# Patient Record
Sex: MALE | Race: WHITE | Employment: FULL TIME | URBAN - METROPOLITAN AREA
[De-identification: names, ages, dates, MRNs, and addresses within clinical notes are randomized per-mention and may not be internally consistent; named-entity substitution may affect disease eponyms.]

---

## 2021-10-12 ENCOUNTER — HOSPITAL ENCOUNTER (EMERGENCY)
Age: 41
Discharge: HOME | End: 2021-10-12
Payer: COMMERCIAL

## 2021-10-12 ENCOUNTER — APPOINTMENT (OUTPATIENT)
Dept: CT IMAGING | Age: 41
End: 2021-10-12
Payer: COMMERCIAL

## 2021-10-12 ENCOUNTER — NURSE TRIAGE (OUTPATIENT)
Dept: OTHER | Facility: CLINIC | Age: 41
End: 2021-10-12

## 2021-10-12 VITALS
WEIGHT: 210 LBS | RESPIRATION RATE: 20 BRPM | OXYGEN SATURATION: 93 % | SYSTOLIC BLOOD PRESSURE: 143 MMHG | HEART RATE: 74 BPM | BODY MASS INDEX: 32.96 KG/M2 | HEIGHT: 67 IN | TEMPERATURE: 97.8 F | DIASTOLIC BLOOD PRESSURE: 90 MMHG

## 2021-10-12 DIAGNOSIS — U07.1 PNEUMONIA DUE TO COVID-19 VIRUS: Primary | ICD-10-CM

## 2021-10-12 DIAGNOSIS — R09.02 HYPOXIA: ICD-10-CM

## 2021-10-12 DIAGNOSIS — J12.82 PNEUMONIA DUE TO COVID-19 VIRUS: Primary | ICD-10-CM

## 2021-10-12 LAB
ALBUMIN SERPL-MCNC: 3.6 G/DL (ref 3.5–4.6)
ALP BLD-CCNC: 62 U/L (ref 35–104)
ALT SERPL-CCNC: 31 U/L (ref 0–41)
ANION GAP SERPL CALCULATED.3IONS-SCNC: 15 MEQ/L (ref 9–15)
AST SERPL-CCNC: 73 U/L (ref 0–40)
BASOPHILS ABSOLUTE: 0 K/UL (ref 0–0.2)
BASOPHILS RELATIVE PERCENT: 0.3 %
BILIRUB SERPL-MCNC: 0.4 MG/DL (ref 0.2–0.7)
BUN BLDV-MCNC: 15 MG/DL (ref 6–20)
C-REACTIVE PROTEIN: 80.8 MG/L (ref 0–5)
CALCIUM SERPL-MCNC: 8.5 MG/DL (ref 8.5–9.9)
CHLORIDE BLD-SCNC: 101 MEQ/L (ref 95–107)
CO2: 24 MEQ/L (ref 20–31)
CREAT SERPL-MCNC: 0.85 MG/DL (ref 0.7–1.2)
EOSINOPHILS ABSOLUTE: 0 K/UL (ref 0–0.7)
EOSINOPHILS RELATIVE PERCENT: 0.3 %
GFR AFRICAN AMERICAN: >60
GFR NON-AFRICAN AMERICAN: >60
GLOBULIN: 3 G/DL (ref 2.3–3.5)
GLUCOSE BLD-MCNC: 132 MG/DL (ref 70–99)
HCT VFR BLD CALC: 46.4 % (ref 42–52)
HEMOGLOBIN: 15.2 G/DL (ref 14–18)
LYMPHOCYTES ABSOLUTE: 0.8 K/UL (ref 1–4.8)
LYMPHOCYTES RELATIVE PERCENT: 11.7 %
MCH RBC QN AUTO: 25.9 PG (ref 27–31.3)
MCHC RBC AUTO-ENTMCNC: 32.7 % (ref 33–37)
MCV RBC AUTO: 79.1 FL (ref 80–100)
MONOCYTES ABSOLUTE: 0.6 K/UL (ref 0.2–0.8)
MONOCYTES RELATIVE PERCENT: 8.5 %
NEUTROPHILS ABSOLUTE: 5.4 K/UL (ref 1.4–6.5)
NEUTROPHILS RELATIVE PERCENT: 79.2 %
PDW BLD-RTO: 14.1 % (ref 11.5–14.5)
PLATELET # BLD: 461 K/UL (ref 130–400)
POC CREATININE WHOLE BLOOD: 0.9
POTASSIUM SERPL-SCNC: 4.1 MEQ/L (ref 3.4–4.9)
RBC # BLD: 5.86 M/UL (ref 4.7–6.1)
SODIUM BLD-SCNC: 140 MEQ/L (ref 135–144)
TOTAL PROTEIN: 6.6 G/DL (ref 6.3–8)
WBC # BLD: 6.8 K/UL (ref 4.8–10.8)

## 2021-10-12 PROCEDURE — 96374 THER/PROPH/DIAG INJ IV PUSH: CPT

## 2021-10-12 PROCEDURE — 86140 C-REACTIVE PROTEIN: CPT

## 2021-10-12 PROCEDURE — 6360000002 HC RX W HCPCS: Performed by: PHYSICIAN ASSISTANT

## 2021-10-12 PROCEDURE — 93005 ELECTROCARDIOGRAM TRACING: CPT

## 2021-10-12 PROCEDURE — 71275 CT ANGIOGRAPHY CHEST: CPT

## 2021-10-12 PROCEDURE — 6360000004 HC RX CONTRAST MEDICATION: Performed by: PHYSICIAN ASSISTANT

## 2021-10-12 PROCEDURE — 36415 COLL VENOUS BLD VENIPUNCTURE: CPT

## 2021-10-12 PROCEDURE — 80053 COMPREHEN METABOLIC PANEL: CPT

## 2021-10-12 PROCEDURE — 85025 COMPLETE CBC W/AUTO DIFF WBC: CPT

## 2021-10-12 PROCEDURE — 99283 EMERGENCY DEPT VISIT LOW MDM: CPT

## 2021-10-12 RX ORDER — DEXAMETHASONE 6 MG/1
6 TABLET ORAL
Qty: 10 TABLET | Refills: 0 | Status: SHIPPED | OUTPATIENT
Start: 2021-10-12 | End: 2021-10-22

## 2021-10-12 RX ORDER — ALBUTEROL SULFATE 90 UG/1
2 AEROSOL, METERED RESPIRATORY (INHALATION) 4 TIMES DAILY PRN
Qty: 18 G | Refills: 1 | Status: SHIPPED | OUTPATIENT
Start: 2021-10-12 | End: 2022-02-10 | Stop reason: SDUPTHER

## 2021-10-12 RX ORDER — DEXAMETHASONE SODIUM PHOSPHATE 10 MG/ML
6 INJECTION INTRAMUSCULAR; INTRAVENOUS ONCE
Status: COMPLETED | OUTPATIENT
Start: 2021-10-12 | End: 2021-10-12

## 2021-10-12 RX ADMIN — IOPAMIDOL 100 ML: 755 INJECTION, SOLUTION INTRAVENOUS at 17:28

## 2021-10-12 RX ADMIN — DEXAMETHASONE SODIUM PHOSPHATE 6 MG: 10 INJECTION INTRAMUSCULAR; INTRAVENOUS at 18:30

## 2021-10-12 ASSESSMENT — ENCOUNTER SYMPTOMS
ABDOMINAL DISTENTION: 0
COUGH: 1
VOMITING: 0
APNEA: 0
NAUSEA: 0
EYE DISCHARGE: 0
ANAL BLEEDING: 0
VOICE CHANGE: 0
SHORTNESS OF BREATH: 1
PHOTOPHOBIA: 0

## 2021-10-12 NOTE — ED NOTES
Bed: 29  Expected date: 10/12/21  Expected time:   Means of arrival:   Comments:  42year old male with difficulty breathing after being diagnosed with covid 12 days ago.  110/60, was 92% on ra but is now  97% on 3 liters 20 in the right ac

## 2021-10-12 NOTE — TELEPHONE ENCOUNTER
Reason for Disposition   MILD difficulty breathing (e.g., minimal/no SOB at rest, SOB with walking, pulse <100)    Answer Assessment - Initial Assessment Questions  1. COVID-19 DIAGNOSIS: \"Who made your Coronavirus (COVID-19) diagnosis? \" \"Was it confirmed by a positive lab test?\" If not diagnosed by a HCP, ask \"Are there lots of cases (community spread) where you live? \" (See public health department website, if unsure)      covid + October 1, 2021 . Tested at [facility]. 2. COVID-19 EXPOSURE: \"Was there any known exposure to COVID before the symptoms began? \" [facility] Definition of close contact: within 6 feet (2 meters) for a total of 15 minutes or more over a 24-hour period. Not that he was aware of    3. ONSET: \"When did the COVID-19 symptoms start? \"       Oct 1, 2021    4. WORST SYMPTOM: \"What is your worst symptom? \" (e.g., cough, fever, shortness of breath, muscle aches)      Gets \"winded\" if he goes to one room to the next - over the last couple days, he feels like this is worsening. 5. COUGH: \"Do you have a cough? \" If Yes, ask: \"How bad is the cough? \"        \"here and there\" yes. When he sits up and takes a deep breath, then he coughs    6. FEVER: \"Do you have a fever? \" If Yes, ask: \"What is your temperature, how was it measured, and when did it start? \"      Never had a fever    7. RESPIRATORY STATUS: \"Describe your breathing? \" (e.g., shortness of breath, wheezing, unable to speak)       See above    8. BETTER-SAME-WORSE: Ruben Antis you getting better, staying the same or getting worse compared to yesterday? \"  If getting worse, ask, \"In what way? \"      Compared to yesterday, \"slighter better\" but he has not got OOB today yet    9. HIGH RISK DISEASE: \"Do you have any chronic medical problems? \" (e.g., asthma, heart or lung disease, weak immune system, obesity, etc.)      Denies    10. PREGNANCY: \"Is there any chance you are pregnant? \" \"When was your last menstrual period? \"        No    11.  OTHER SYMPTOMS: \"Do you have any other symptoms? \"  (e.g., chills, fatigue, headache, loss of smell or taste, muscle pain, sore throat; new loss of smell or taste especially support the diagnosis of COVID-19)        Weakness, fatigue, appetite is different than usual , he states he is drinking/keeping hydrated . Sore throat present, voice is very hoarse. Protocols used: CORONAVIRUS (OAKYP-35) DIAGNOSED OR SUSPECTED-ADULT-OH    Patient called E[facility] with red flag complaint to establish care with unknown . Brief description of triage: see above    Triage indicates for patient to ER/UC/or office with pcp approval .  Per workflow, (pt is unestablished) this RN recommended either ER or UC. Care advice provided, patient verbalizes understanding; denies any other questions or concerns; instructed to call back for any new or worsening symptoms. Writer provided warm transfer to [facility] at [facility] for appointment scheduling to establish care. Attention Provider: Thank you for allowing me to participate in the care of your patient. The patient was connected to triage in response to information provided to the ECC. Please do not respond through this encounter as the response is not directed to a shared pool.

## 2021-10-12 NOTE — ED TRIAGE NOTES
Pt arrives to ED via [facility] in regards to SOB. Pt states he was tested for Covid on the 1st when he began to have symptoms. Pts test came back positive on the 5th. Pt reports SOB began to worsen approximately 2 days ago. Pt went to Urgent Care today and was brought here due to being diaphoretic. A&OX4. Skin pale, cool, and clammy. Resps mildly labored on room air.

## 2021-10-12 NOTE — ED PROVIDER NOTES
[facility] ED  eMERGENCY dEPARTMENT eNCOUnter      Pt Name: Idania Mejias  MRN: [id number]  [place] 1980  Date of evaluation: 10/12/2021  Provider: Debbie Machado Dr       Chief Complaint   Patient presents with    Shortness of Breath         HISTORY OF PRESENT ILLNESS   (Location/Symptom, Timing/Onset,Context/Setting, Quality, Duration, Modifying Factors, Severity)  Note limiting factors. Idania Mejias is a 42 y.o. male who presents to the emergency department presents with shortness of breath was tested for Covid on the first extremity began to have symptoms became back positive fifth shortness of breath worsened approximately 2 days ago he does have cough he was sent from urgent care patient was cool and clammy on room air. Patient noted to be 91 to 92% on room air at rest.  Symptoms moderate severity worse with motion or ambulation or exertion nothing improves symptoms    HPI    NursingNotes were reviewed. REVIEW OF SYSTEMS    (2-9 systems for level 4, 10 or more for level 5)     Review of Systems   Constitutional: Positive for diaphoresis. Negative for activity change, appetite change, fever and unexpected weight change. HENT: Negative for ear discharge, nosebleeds and voice change. Eyes: Negative for photophobia and discharge. Respiratory: Positive for cough and shortness of breath. Negative for apnea. Cardiovascular: Negative for chest pain. Gastrointestinal: Negative for abdominal distention, anal bleeding, nausea and vomiting. Endocrine: Negative for cold intolerance, heat intolerance and polyphagia. Genitourinary: Negative for dysuria and genital sores. Musculoskeletal: Negative for joint swelling and neck pain. Skin: Negative for pallor. Allergic/Immunologic: Negative for immunocompromised state. Neurological: Negative for seizures and facial asymmetry. Hematological: Does not bruise/bleed easily.    Psychiatric/Behavioral: Negative for behavioral problems, self-injury and sleep disturbance. All other systems reviewed and are negative. Except as noted above the remainder of the review of systems was reviewed and negative. PAST MEDICAL HISTORY     Past Medical History:   Diagnosis Date    Bell palsy          SURGICALHISTORY     History reviewed. No pertinent surgical history. CURRENT MEDICATIONS       Previous Medications    No medications on file            Patient has no known allergies. FAMILY HISTORY     History reviewed. No pertinent family history. SOCIAL HISTORY       Social History     Socioeconomic History    Marital status: Single     Spouse name: None    Number of children: None    Years of education: None    Highest education level: None   Occupational History    None   Tobacco Use    Smoking status: Former Smoker     Types: Cigarettes     Quit date: 2007     Years since quittin.7   Substance and Sexual Activity    Alcohol use: Yes     Alcohol/week: 5.0 standard drinks     Types: 6 drink(s) per week    Drug use: None    Sexual activity: None   Other Topics Concern    None   Social History Narrative    None     Social Determinants of Health     Financial Resource Strain:     Difficulty of Paying Living Expenses:    Food Insecurity:     Worried About Running Out of Food in the Last Year:     Ran Out of Food in the Last Year:    Transportation Needs:     Lack of Transportation (Medical):      Lack of Transportation (Non-Medical):    Physical Activity:     Days of Exercise per Week:     Minutes of Exercise per Session:    Stress:     Feeling of Stress :    Social Connections:     Frequency of Communication with Friends and Family:     Frequency of Social Gatherings with Friends and Family:     Attends [employer] Services:     Active Member of Clubs or Organizations:     Attends Club or Organization Meetings:     Marital Status:    Intimate Partner Violence:     Fear of Current or Ex-Partner:     Emotionally Abused:     Physically Abused:     Sexually Abused:        SCREENINGS   Ebola Virus Disease (EVD) Screening   Temp: 97.8 °F (36.6 °C)  CIWA Assessment  BP: (!) 143/90  Pulse: 74    PHYSICAL EXAM    (up to 7 for level 4, 8 or more for level 5)     ED Triage Vitals   BP Temp Temp Source Pulse Resp SpO2 Height Weight   10/12/21 1556 10/12/21 1556 10/12/21 1556 10/12/21 1556 10/12/21 1556 10/12/21 1602 10/12/21 1556 10/12/21 1556   (!) 143/90 97.8 °F (36.6 °C) Oral 74 20 93 % 5' 7\" (1.702 m) 210 lb (95.3 kg)       Physical Exam  Vitals and nursing note reviewed. Constitutional:       General: He is not in acute distress. Appearance: He is well-developed. HENT:      Head: Normocephalic and atraumatic. Right Ear: External ear normal.      Left Ear: External ear normal.      Nose: Nose normal.      Mouth/Throat:      Mouth: Mucous membranes are moist.   Eyes:      General:         Right eye: No discharge. Left eye: No discharge. Pupils: Pupils are equal, round, and reactive to light. Cardiovascular:      Rate and Rhythm: Normal rate and regular rhythm. Pulses: Normal pulses. Heart sounds: Normal heart sounds. Pulmonary:      Effort: Pulmonary effort is normal. No respiratory distress. Breath sounds: No stridor. Decreased breath sounds present. No wheezing, rhonchi or rales. Chest:      Chest wall: No tenderness. Abdominal:      General: Bowel sounds are normal. There is no distension. Palpations: Abdomen is soft. Tenderness: There is no abdominal tenderness. Musculoskeletal:         General: Normal range of motion. Cervical back: Normal range of motion and neck supple. Skin:     General: Skin is warm. Findings: No erythema. Neurological:      Mental Status: He is alert and oriented to person, place, and time.    Psychiatric:         Mood and Affect: Mood normal.         RESULTS     EKG: All EKG's are interpreted by the Emergency Department Physician who either signs or Co-signsthis chart in the absence of a cardiologist.    EKG normal sinus rhythm rate 76 - ST segment elevation.  ms QRS 88 ms QT 4 8 ms PRT axes positive    RADIOLOGY:   Non-plain filmimages such as CT, Ultrasound and MRI are read by the radiologist. Plain radiographic images are visualized and preliminarily interpreted by the emergency physician with the below findings:         Interpretation per the Radiologist below, if available at the time ofthis note:    CTA Chest W WO  (PE study)   Final Result   Diffuse bilateral peripheral groundglass opacity. Finding may be seen in patients with covid 19 pneumonia. However, other infectious and inflammatory etiologies may result in a similar radiographic appearance      Bilateral lower lobe subsegmental atelectasis/pneumonia. No CT evidence pulmonary embolism. All CT scans at this facility use dose modulation, iterative reconstruction, and/or weight based dosing when appropriate to reduce radiation dose to as low as reasonably achievable. ED BEDSIDE ULTRASOUND:   Performed by ED Physician - none    LABS:  Labs Reviewed   COMPREHENSIVE METABOLIC PANEL - Abnormal; Notable for the following components:       Result Value    Glucose 132 (*)     AST 73 (*)     All other components within normal limits   CBC WITH AUTO DIFFERENTIAL - Abnormal; Notable for the following components:    MCV 79.1 (*)     MCH 25.9 (*)     MCHC 32.7 (*)     Platelets 079 (*)     Lymphocytes Absolute 0.8 (*)     All other components within normal limits   C-REACTIVE PROTEIN - Abnormal; Notable for the following components:    CRP 80.8 (*)     All other components within normal limits   POCT CREATININE - URINE - Normal       All other labs were within normal range or not returned as of this dictation.     EMERGENCY DEPARTMENT COURSE and DIFFERENTIAL DIAGNOSIS/MDM:   Vitals:    Vitals:    10/12/21 1556 10/12/21 1602   BP: (!) 143/90    Pulse: 74    Resp: 20    Temp: 97.8 °F (36.6 °C)    TempSrc: Oral    SpO2:  93%   Weight: 210 lb (95.3 kg)    Height: 5' 7\" (1.702 m)             MDM  Number of Diagnoses or Management Options  Hypoxia  Pneumonia due to COVID-19 virus  Diagnosis management comments: Patient states positive Covid test performed at [facility] on the first was by several days later. Patient noted to be 90% on room air lying down we will add oxygen albuterol and Decadron for home will reassigned to primary care patient to return to if any symptoms worsen or new symptoms develop including pulse oximetry 89% or less on oxygen. Amount and/or Complexity of Data Reviewed  Clinical lab tests: reviewed and ordered  Tests in the radiology section of CPT®: reviewed and ordered  Discuss the patient with other providers: yes        CRITICAL CARE TIME       CONSULTS:  None    PROCEDURES:  Unless otherwise noted below, none     Procedures    FINAL IMPRESSION      1. Pneumonia due to COVID-19 virus    2.  Hypoxia          DISPOSITION/PLAN   DISPOSITION        PATIENT REFERRED TO:  MARICARMEN Sanchez CNP  [address] [place] [address]  [phone]    Call in 1 day      [facility]) ED  [address] [facility] [address]  [phone]    If symptoms worsen      DISCHARGE MEDICATIONS:  New Prescriptions    ALBUTEROL SULFATE HFA (VENTOLIN HFA) 108 (90 BASE) MCG/ACT INHALER    Inhale 2 puffs into the lungs 4 times daily as needed for Wheezing or Shortness of Breath With spacer and instruct on use    DEXAMETHASONE (DECADRON) 6 MG TABLET    Take 1 tablet by mouth daily (with breakfast) for 10 days          (Please note that portions of this note were completed with a voice recognition program.  Efforts were made to edit the dictations but occasionally words are mis-transcribed.)    Seema Echeverria PA-C (electronically signed)  Attending Emergency Physician       Seema Echeverria PEREZ  10/12/21 0825

## 2021-10-12 NOTE — ED NOTES
This nurse explained and demonstrated how to you oxygen machine and how to use and read pulse oximetry device pt was able to verbalize instructions       Izzy Davidson RN  10/12/21 1958

## 2021-10-13 ENCOUNTER — TELEPHONE (OUTPATIENT)
Dept: FAMILY MEDICINE CLINIC | Age: 41
End: 2021-10-13

## 2021-10-13 ENCOUNTER — VIRTUAL VISIT (OUTPATIENT)
Dept: FAMILY MEDICINE CLINIC | Age: 41
End: 2021-10-13
Payer: COMMERCIAL

## 2021-10-13 DIAGNOSIS — J12.82 PNEUMONIA DUE TO COVID-19 VIRUS: Primary | ICD-10-CM

## 2021-10-13 DIAGNOSIS — U07.1 PNEUMONIA DUE TO COVID-19 VIRUS: Primary | ICD-10-CM

## 2021-10-13 PROCEDURE — 99423 OL DIG E/M SVC 21+ MIN: CPT | Performed by: STUDENT IN AN ORGANIZED HEALTH CARE EDUCATION/TRAINING PROGRAM

## 2021-10-13 SDOH — ECONOMIC STABILITY: TRANSPORTATION INSECURITY
IN THE PAST 12 MONTHS, HAS LACK OF TRANSPORTATION KEPT YOU FROM MEETINGS, WORK, OR FROM GETTING THINGS NEEDED FOR DAILY LIVING?: NO

## 2021-10-13 SDOH — ECONOMIC STABILITY: FOOD INSECURITY: WITHIN THE PAST 12 MONTHS, YOU WORRIED THAT YOUR FOOD WOULD RUN OUT BEFORE YOU GOT MONEY TO BUY MORE.: NEVER TRUE

## 2021-10-13 SDOH — ECONOMIC STABILITY: FOOD INSECURITY: WITHIN THE PAST 12 MONTHS, THE FOOD YOU BOUGHT JUST DIDN'T LAST AND YOU DIDN'T HAVE MONEY TO GET MORE.: NEVER TRUE

## 2021-10-13 SDOH — ECONOMIC STABILITY: TRANSPORTATION INSECURITY
IN THE PAST 12 MONTHS, HAS THE LACK OF TRANSPORTATION KEPT YOU FROM MEDICAL APPOINTMENTS OR FROM GETTING MEDICATIONS?: NO

## 2021-10-13 ASSESSMENT — ENCOUNTER SYMPTOMS
DIARRHEA: 0
COUGH: 1
NAUSEA: 0
SORE THROAT: 0
WHEEZING: 0
EYE DISCHARGE: 0
RHINORRHEA: 1
ABDOMINAL PAIN: 0
VOMITING: 0
SINUS PAIN: 0
SHORTNESS OF BREATH: 1
SINUS PRESSURE: 0

## 2021-10-13 ASSESSMENT — PATIENT HEALTH QUESTIONNAIRE - PHQ9
1. LITTLE INTEREST OR PLEASURE IN DOING THINGS: 0
SUM OF ALL RESPONSES TO PHQ9 QUESTIONS 1 & 2: 0
SUM OF ALL RESPONSES TO PHQ QUESTIONS 1-9: 0
2. FEELING DOWN, DEPRESSED OR HOPELESS: 0
SUM OF ALL RESPONSES TO PHQ QUESTIONS 1-9: 0
SUM OF ALL RESPONSES TO PHQ QUESTIONS 1-9: 0

## 2021-10-13 ASSESSMENT — SOCIAL DETERMINANTS OF HEALTH (SDOH): HOW HARD IS IT FOR YOU TO PAY FOR THE VERY BASICS LIKE FOOD, HOUSING, MEDICAL CARE, AND HEATING?: NOT HARD AT ALL

## 2021-10-13 NOTE — TELEPHONE ENCOUNTER
Pt mom calling. mom ph [phone]  [place]. Pt recently in ED. Post covid - pneumonia. Pt has appt with [facility] to establish care on 10/14/2021    Yesterday patient O2 was around 90 - 93%  Today ot O2 is at 92%    Pt mom concerned. And would like to know what is advised.

## 2021-10-13 NOTE — PROGRESS NOTES
Thaddeus Arnold (:  1980) is a 42 y.o. male,New patient, here for evaluation of the following chief complaint(s): Follow-up (ER visit 10/12/21 tested positive for COVID & pneumonia. Sister reports patient is having a hard time talking due to being SOB. Is using inhaler, but is coughing when using it so not sure how effective it is. PO is staying around 92 in a reclined position. Is not feeling much better today. )         ASSESSMENT/PLAN:  1. Pneumonia due to COVID-19 virus  Patient with diagnosis of COVID-19 pneumonia. He was started on steroid, albuterol and oxygen therapy. He does seem to be somewhat improved today per his report. He is outside the window for antibody therapy at this time. Return precautions explained. I did instruct both him and his sister that if he develops severe shortness of breath, chest pain, low pulse ox despite treatment with oxygen or any other concerning symptoms they should seek care in the emergency room right away. Both voiced understanding. Supportive care recommended. Plan for follow-up in 2 to 3 weeks with primary care provider. All questions answered. Follow-up as scheduled. Return in about 2 weeks (around 10/27/2021) for follow up. SUBJECTIVE/OBJECTIVE:  HPI     Patient with follow-up for ER visit after being diagnosed with COVID-19 pneumonia. He states that he tested positive at [facility] on 10/1. His result was received on 10/5. He was seen in the ER yesterday. He was started on steroids, albuterol and oxygen therapy. He does report that he feels somewhat better today. He states that his Covid symptoms have improved. He does have a lingering cough and shortness of breath. He is especially short of breath with exertion. No fevers. No nausea or vomiting. He states that his inhaler does help his shortness of breath. He states that the previous 2 days were worse. No chest pain. No wheezing. It is just a dry cough. He does report that he is improving overall.  He is currently on 3 L of oxygen. His pulse ox has been around 95% while sitting up. In a reclined position it is 92 to 93%. It is 90% when laying down. He is overall healthy. No past medical history. He is not on any daily medications. He has no other concerns at this time. Review of Systems   Constitutional: Positive for chills and fatigue. Negative for fever and unexpected weight change. HENT: Positive for congestion and rhinorrhea. Negative for sinus pressure, sinus pain and sore throat. Eyes: Negative for discharge. Respiratory: Positive for cough and shortness of breath. Negative for wheezing. Cardiovascular: Negative for chest pain, palpitations and leg swelling. Gastrointestinal: Negative for abdominal pain, diarrhea, nausea and vomiting. Musculoskeletal: Negative for arthralgias and joint swelling. Skin: Negative for rash. Neurological: Negative for weakness, light-headedness, numbness and headaches. Psychiatric/Behavioral: Negative for confusion. The patient is not nervous/anxious. No flowsheet data found. Physical Exam  Due to nature of virtual visit, unable to complete full physical exam at this time, patient does not sound short of breath while talking. He does not appear to be in respiratory distress. Nasal cannula oxygen is in place. On this date 10/13/2021 I have spent 25 minutes reviewing previous notes, test results and face to face (virtual) with the patient discussing the diagnosis and importance of compliance with the treatment plan as well as documenting on the day of the visit. Abilio Gonzalez, was evaluated through a synchronous (real-time) audio-video encounter. The patient (or guardian if applicable) is aware that this is a billable service. Verbal consent to proceed has been obtained within the past 12 months.  The visit was conducted pursuant to the emergency declaration under the [address] waiver authority and the [employer] Act. Patient identification was verified, and a caregiver was present when appropriate. The patient was located in a state where the provider was credentialed to provide care. An electronic signature was used to authenticate this note.     --Erasmo Aponte, DO

## 2021-10-13 NOTE — PATIENT INSTRUCTIONS
Patient Education        Learning About COVID-19 and Flu Symptoms  How can you tell COVID-19 from the flu? COVID-19 and the flu have similar symptoms. The two can be hard to tell apart. The only way to know for sure which illness you have is to be tested. Since the symptoms are so alike, it makes sense to act as if you have COVID-19 until your test results come back. This means staying home and limiting contact with people in your home. You'll need to wash your hands often and disinfect surfaces that you touch. And be sure to wear a mask when you're around other people. This is also good advice if you think you have the flu. COVID-19 and the flu have these symptoms in common:  · Fever or chills  · Cough  · Shortness of breath  · Fatigue (tiredness)  · Sore throat  · Runny or stuffy nose  · Muscle and body aches  · Headache  · Vomiting and diarrhea (more common in children than adults)  COVID-19 has another symptom that also may occur:  · New loss of taste or smell  COVID-19 symptoms may appear from 2 to 14 days after infection. Flu symptoms usually appear 1 to 4 days after infection. Why should you get a flu shot during the COVID-19 pandemic? It's important to get your yearly flu vaccine. Both the flu and COVID-19 can be active at the same time. You can get sick with both infections at once. And having both may make you more sick than getting just one. The flu vaccine won't protect you from COVID-19. But it can help prevent the flu or reduce its symptoms. If fewer people get very ill with the flu, this will help free up medical resources that are needed for COVID-19 patients. Where can you learn more? Go to https://[employer]userfox.[employer]dresses. org and sign in to your [employer] account. Enter C123 in the [employer] box to learn more about \"Learning About COVID-19 and Flu Symptoms. \"     If you do not have an account, please click on the \"Sign Up Now\" link.   Current as of: March 26, 2021               Content Version: 13.0  © 7089-1446 Healthwise, Incorporated. Care instructions adapted under license by [facility] ([facility]). If you have questions about a medical condition or this instruction, always ask your healthcare professional. [address] any warranty or liability for your use of this information.

## 2021-10-13 NOTE — ED NOTES
While walking pt out to lobby for discharge. Pt needed to stop due to sob.  This nurse put on oxygen that he was sent home with pt states he feels better      Yan Eisenberg RN  10/12/21 2011

## 2021-10-14 ENCOUNTER — VIRTUAL VISIT (OUTPATIENT)
Dept: FAMILY MEDICINE CLINIC | Age: 41
End: 2021-10-14
Payer: COMMERCIAL

## 2021-10-14 DIAGNOSIS — U07.1 PNEUMONIA DUE TO COVID-19 VIRUS: Primary | ICD-10-CM

## 2021-10-14 DIAGNOSIS — J12.82 PNEUMONIA DUE TO COVID-19 VIRUS: Primary | ICD-10-CM

## 2021-10-14 PROCEDURE — 99442 PR PHYS/QHP TELEPHONE EVALUATION 11-20 MIN: CPT | Performed by: NURSE PRACTITIONER

## 2021-10-14 ASSESSMENT — ENCOUNTER SYMPTOMS
SHORTNESS OF BREATH: 1
COUGH: 1

## 2021-10-14 NOTE — PROGRESS NOTES
10/14/2021    TELEHEALTH EVALUATION -- Audio/Visual (During JNEKO-13 public health emergency)    Due to COVID 19 outbreak, patient's office visit was converted to a virtual visit. Patient was contacted and agreed to proceed with a virtual visit via telephone call. The risks and benefits of converting to a virtual visit were discussed in light of the current infectious disease epidemic. Patient also understood that insurance coverage and co-pays are up to their individual insurance plans. This visit started at 18    HPI:    Solo Landakimmie (:  1980) has requested an audio/video evaluation for the following concern(s):    Dx with covid pneumonia. Things have been going \"ok\"  Does have significant cough. Wondering what he can take. Has coricidan at home. O2 this am is 96%. Oxygen does drop in the low 90s. Seems better when sitting up. Is on oxygen all the time- around 3 Liters    Review of Systems   Constitutional: Positive for fatigue. Respiratory: Positive for cough and shortness of breath. Cardiovascular: Negative for chest pain. Neurological: Positive for weakness. Prior to Visit Medications    Medication Sig Taking? Authorizing Provider   albuterol sulfate HFA (VENTOLIN HFA) 108 (90 Base) MCG/ACT inhaler Inhale 2 puffs into the lungs 4 times daily as needed for Wheezing or Shortness of Breath With spacer and instruct on use Yes Shi Cintron PA-C   dexamethasone (DECADRON) 6 MG tablet Take 1 tablet by mouth daily (with breakfast) for 10 days Yes Shi Cintron PA-C       Social History     Tobacco Use    Smoking status: Former Smoker     Packs/day: 0.25     Years: 2.00     Pack years: 0.50     Types: Cigarettes     Quit date: 2007     Years since quittin.7    Smokeless tobacco: Never Used   Substance Use Topics    Alcohol use:  Yes     Alcohol/week: 5.0 standard drinks     Types: 6 drink(s) per week    Drug use: Not on file        No Known Allergies,   Past Medical History:   Diagnosis Date    Bell palsy    , No past surgical history on file.,   Social History     Tobacco Use    Smoking status: Former Smoker     Packs/day: 0.25     Years: 2.00     Pack years: 0.50     Types: Cigarettes     Quit date: 2007     Years since quittin.7    Smokeless tobacco: Never Used   Substance Use Topics    Alcohol use: Yes     Alcohol/week: 5.0 standard drinks     Types: 6 drink(s) per week    Drug use: Not on file   , No family history on file. PHYSICAL EXAMINATION:  [ INSTRUCTIONS:  \"[x]\" Indicates a positive item  \"[]\" Indicates a negative item  -- DELETE ALL ITEMS NOT EXAMINED]  [x] Alert  [x] Oriented to person/place/time    [x] No apparent distress  [] Toxic appearing    [] Face flushed appearing [] Sclera clear  [] Lips are cyanotic      [] Breathing appears normal  [] Appears tachypneic      [] Rash on visible skin    [] Cranial Nerves II-XII grossly intact    [] Motor grossly intact in visible upper extremities    [] Motor grossly intact in visible lower extremities    [x] Normal Mood  [] Anxious appearing    [] Depressed appearing  [] Confused appearing      [] Poor short term memory  [] Poor long term memory    [] OTHER:      Due to this being a TeleHealth encounter, evaluation of the following organ systems is limited: Vitals/Constitutional/EENT/Resp/CV/GI//MS/Neuro/Skin/Heme-Lymph-Imm. ASSESSMENT/PLAN:  1. Pneumonia due to COVID-19 virus  - reassured. Informed mom of warning signs for worsening conditions. Side effects, adverse effects of the medication prescribed today, as well as treatment plan/ rationale and result expectations have been discussed with the patient who expresses understanding and desires to proceed. Close follow up to evaluate treatment results and for coordination of care. I have reviewed the patient's medical history in detail and updated the computerized patient record.     As always, patient is advised

## 2021-10-15 ENCOUNTER — TELEPHONE (OUTPATIENT)
Dept: FAMILY MEDICINE CLINIC | Age: 41
End: 2021-10-15

## 2021-10-15 LAB
EKG ATRIAL RATE: 76 BPM
EKG P AXIS: 11 DEGREES
EKG P-R INTERVAL: 130 MS
EKG Q-T INTERVAL: 408 MS
EKG QRS DURATION: 88 MS
EKG QTC CALCULATION (BAZETT): 459 MS
EKG R AXIS: 20 DEGREES
EKG T AXIS: 69 DEGREES
EKG VENTRICULAR RATE: 76 BPM

## 2021-10-15 NOTE — TELEPHONE ENCOUNTER
I would keep it on all the time and make appointment in two weeks and we can check his o2 requirements in the office

## 2021-10-15 NOTE — TELEPHONE ENCOUNTER
Pt wants to know about o2 and if he needs to be on it all the time or can ween down  Pt with pne    Sitting up his o2 is  95-97    Walking  Unsure    Moving around a little more   Appetite is better and cough is productive vs dry    Currently using 3 litters continually    Sister is asking if o2 can be used as needed or does he keep it on all the time? ?    (Dr. Joey Leahy out of office)

## 2021-10-18 ENCOUNTER — TELEPHONE (OUTPATIENT)
Dept: FAMILY MEDICINE CLINIC | Age: 41
End: 2021-10-18

## 2021-10-18 NOTE — TELEPHONE ENCOUNTER
Patient's mother calling. Would it be possible to get a smaller oxygen machine? The one patient has now  Is very big and they can not have it in his Bedroom.     [phone]

## 2021-10-18 NOTE — TELEPHONE ENCOUNTER
I would have patient contact the DME provider to see if they have a smaller unit available.   Thanks

## 2021-10-18 NOTE — TELEPHONE ENCOUNTER
SHAHANA informed they should contact the DME company & request something smaller. Told them if the DME company needs something from the Dr. Sheryl Melvin could fax us what is needed. Informed to call back with any other questions.

## 2021-10-27 ENCOUNTER — OFFICE VISIT (OUTPATIENT)
Dept: FAMILY MEDICINE CLINIC | Age: 41
End: 2021-10-27
Payer: COMMERCIAL

## 2021-10-27 VITALS
WEIGHT: 196 LBS | BODY MASS INDEX: 29.7 KG/M2 | OXYGEN SATURATION: 100 % | HEIGHT: 68 IN | SYSTOLIC BLOOD PRESSURE: 100 MMHG | DIASTOLIC BLOOD PRESSURE: 70 MMHG | HEART RATE: 89 BPM

## 2021-10-27 DIAGNOSIS — Z86.16 HISTORY OF COVID-19: Primary | ICD-10-CM

## 2021-10-27 DIAGNOSIS — Z13.6 ENCOUNTER FOR LIPID SCREENING FOR CARDIOVASCULAR DISEASE: ICD-10-CM

## 2021-10-27 DIAGNOSIS — Z13.220 ENCOUNTER FOR LIPID SCREENING FOR CARDIOVASCULAR DISEASE: ICD-10-CM

## 2021-10-27 DIAGNOSIS — Z13.1 SCREENING FOR DIABETES MELLITUS (DM): ICD-10-CM

## 2021-10-27 DIAGNOSIS — G93.31 POSTVIRAL FATIGUE SYNDROME: ICD-10-CM

## 2021-10-27 PROCEDURE — 99214 OFFICE O/P EST MOD 30 MIN: CPT | Performed by: STUDENT IN AN ORGANIZED HEALTH CARE EDUCATION/TRAINING PROGRAM

## 2021-10-27 RX ORDER — ACETAMINOPHEN 160 MG
TABLET,DISINTEGRATING ORAL
COMMUNITY

## 2021-10-27 RX ORDER — MULTIVIT WITH MINERALS/LUTEIN
250 TABLET ORAL DAILY
COMMUNITY

## 2021-10-27 NOTE — PROGRESS NOTES
Subjective  Brown Oneill, 42 y.o. male presents today with:  Chief Complaint   Patient presents with    2 Week Follow-Up     States he is feeling better than last week. States he is still very fatigued. Can only do something for a short amount of time before he has to sit & rest. States he still has occasional \"tightness\" in his chest. Is wear oxygen daily. States he is able to shower without it but has to put it back on within 10-15 min. HPI     Patient with appointment for 2-week follow-up after recent COVID-19 infection. He states he is feeling a lot better than when he was first diagnosed with Covid. He is still wearing nasal cannula oxygen. He states that he has not really been checking his pulse ox anymore as he has been feeling well. He states that he does have some shortness of breath with exertion. He states he is not really sure what his oxygen levels do is he is not really wearing the pulse oximeter. He states that if he takes it off for a little while he does become more short of breath. He states that he thinks he may be anxious when he takes it off. He denies any chest pain, fevers or chills. No lingering cough. He is interested in seeing pulmonology. He does report good sleep. He does need paperwork completed to return to work. He works as a  at [employer]. He states he is not sure how he will do at work but is wanting to get back to work. He has no other concerns at this time. Review of Systems   Constitutional: Negative for chills, fatigue, fever and unexpected weight change. HENT: Negative for congestion, rhinorrhea and sore throat. Eyes: Negative for discharge. Respiratory: Positive for shortness of breath. Negative for cough and wheezing. Cardiovascular: Negative for chest pain, palpitations and leg swelling. Gastrointestinal: Negative for abdominal pain, diarrhea, nausea and vomiting. Genitourinary: Negative for difficulty urinating. Musculoskeletal: Negative for arthralgias and joint swelling. Skin: Negative for rash. Neurological: Negative for weakness, light-headedness, numbness and headaches. Psychiatric/Behavioral: Negative for confusion. The patient is not nervous/anxious. Past Medical History:   Diagnosis Date    Bell palsy      History reviewed. No pertinent surgical history. Current Outpatient Medications   Medication Sig Dispense Refill    Cholecalciferol (VITAMIN D3) 50 MCG (2000 UT) CAPS Take by mouth      Ascorbic Acid (VITAMIN C) 250 MG tablet Take 250 mg by mouth daily      albuterol sulfate HFA (VENTOLIN HFA) 108 (90 Base) MCG/ACT inhaler Inhale 2 puffs into the lungs 4 times daily as needed for Wheezing or Shortness of Breath With spacer and instruct on use 18 g 1     No current facility-administered medications for this visit. PMH, Surgical Hx, Family Hx, and Social Hx reviewed and updated. Health Maintenance reviewed. Objective    Vitals:    10/27/21 1700   BP: 100/70   Pulse: 89   SpO2: 100%   Weight: 196 lb (88.9 kg)   Height: 5' 7.5\" (1.715 m)       Physical Exam  Vitals reviewed. Constitutional:       General: He is not in acute distress. HENT:      Head: Normocephalic and atraumatic. Eyes:      Conjunctiva/sclera: Conjunctivae normal.   Neck:      Thyroid: No thyromegaly or thyroid tenderness. Cardiovascular:      Rate and Rhythm: Normal rate and regular rhythm. Heart sounds: No murmur heard. No friction rub. No gallop. Pulmonary:      Effort: Pulmonary effort is normal.      Breath sounds: Normal breath sounds. No wheezing, rhonchi or rales. Abdominal:      General: Bowel sounds are normal. There is no distension. Palpations: Abdomen is soft. Tenderness: There is no abdominal tenderness. Musculoskeletal:         General: No swelling or deformity. Cervical back: Normal range of motion and neck supple. Right lower leg: No edema.       Left lower leg: No edema.   Lymphadenopathy:      Cervical: No cervical adenopathy. Skin:     General: Skin is warm and dry. Findings: No rash. Neurological:      General: No focal deficit present. Mental Status: He is alert. Gait: Gait is intact. Psychiatric:         Mood and Affect: Mood normal.         Behavior: Behavior normal.       Assessment & Plan     1. History of COVID-19  Patient with history of COVID-19 infection currently on nasal cannula oxygen. We did discuss tapering off of the oxygen and seeing how his symptoms do. He states he will try this over the next week as he is waiting to get back to work. Paperwork was completed for him to return to work next week. We will also place referral to pulmonology for further lung evaluation. His lungs are clear on exam today. No wheezing. We will continue to monitor. Follow-up in 4 to 6 weeks. - Bryn Rubio MD, Pulmonology, [place]    2. Screening for diabetes mellitus (DM)  Patient due for screening for diabetes mellitus. Will call with results when returned  - Glucose, Fasting; Future    3. Encounter for lipid screening for cardiovascular disease  Patient due for lipid panel screening. Will call with results when returned  - Lipid, Fasting; Future    4. Postviral fatigue syndrome  Patient with continued fatigue after recent viral infection with Covid. We did discuss the nature of Covid and how it can take several months to feel back to your usual state of health. We also discussed vaccine recommendations and I did encourage him to get his Covid vaccine as well as his flu shot. He declined flu shot today. Would still recommend Covid vaccine. He is going to think about it. Follow-up with pulmonology once scheduled.     Orders Placed This Encounter   Procedures    Lipid, Fasting     Standing Status:   Future     Standing Expiration Date:   10/27/2022    Glucose, Fasting     Standing Status:   Future     Standing Expiration Date: 10/27/2022   Prasad Rubio MD, Pulmonology, [place]     Referral Priority:   Routine     Referral Type:   Eval and Treat     Referral Reason:   Specialty Services Required     Referred to Provider:   Barby Pierre MD     Requested Specialty:   Pulmonology     Number of Visits Requested:   1     No orders of the defined types were placed in this encounter. There are no discontinued medications. Return in about 4 weeks (around 11/24/2021) for follow up.    30 minutes spent talking with patient and reviewing chart. Reviewed with the patient: current clinical status,medications, activities and diet. Side effects, adverse effects of themedication prescribed today, as well as treatment plan/ rationale and result expectations have been discussed with the patient who expresses understanding and desires to proceed. Close follow up to evaluate treatment results and for coordination of care. I have reviewed the patient's medical history in detail and updated the computerized patient record. Please note, this report has been partially produced using speech recognition software and may cause  and /or contain errors related to that system including grammar, punctuation and spelling as well as words and phrases that may seem inappropriate. If there are questions or concerns please feel free to contact me to clarify.     Bill Caal, DO

## 2021-10-28 ASSESSMENT — ENCOUNTER SYMPTOMS
EYE DISCHARGE: 0
COUGH: 0
VOMITING: 0
RHINORRHEA: 0
ABDOMINAL PAIN: 0
DIARRHEA: 0
WHEEZING: 0
SHORTNESS OF BREATH: 1
NAUSEA: 0
SORE THROAT: 0

## 2021-11-24 ENCOUNTER — OFFICE VISIT (OUTPATIENT)
Dept: FAMILY MEDICINE CLINIC | Age: 41
End: 2021-11-24
Payer: COMMERCIAL

## 2021-11-24 VITALS
SYSTOLIC BLOOD PRESSURE: 112 MMHG | OXYGEN SATURATION: 97 % | DIASTOLIC BLOOD PRESSURE: 78 MMHG | WEIGHT: 207 LBS | TEMPERATURE: 96.3 F | BODY MASS INDEX: 32.49 KG/M2 | HEART RATE: 96 BPM | HEIGHT: 67 IN

## 2021-11-24 DIAGNOSIS — G93.31 POSTVIRAL FATIGUE SYNDROME: ICD-10-CM

## 2021-11-24 DIAGNOSIS — Z86.16 HISTORY OF COVID-19: Primary | ICD-10-CM

## 2021-11-24 PROCEDURE — 99213 OFFICE O/P EST LOW 20 MIN: CPT | Performed by: STUDENT IN AN ORGANIZED HEALTH CARE EDUCATION/TRAINING PROGRAM

## 2021-11-24 ASSESSMENT — ENCOUNTER SYMPTOMS
ABDOMINAL PAIN: 0
DIARRHEA: 0
SORE THROAT: 0
RHINORRHEA: 0
WHEEZING: 0
VOMITING: 0
NAUSEA: 0
SHORTNESS OF BREATH: 1
COUGH: 0

## 2021-11-24 NOTE — PATIENT INSTRUCTIONS
Patient Education     albuterol inhalation  Pronunciation:  al [address] all  Brand:  ProAir HFA, ProAir RespiClick, Proventil HFA, Ventolin HFA  What is the most important information I should know about albuterol inhalation? Follow all directions on your medicine label and package. Tell each of your healthcare providers about all your medical conditions, allergies, and all medicines you use. What is albuterol inhalation? Albuterol inhalation is a bronchodilator that is used to treat or prevent bronchospasm in people with reversible obstructive airway disease. Albuterol is also used to prevent exercise-induced bronchospasm. Albuterol inhalation is for use in adults and children at least 3years old. Albuterol inhalation may also be used for purposes not listed in this medication guide. What should I discuss with my healthcare provider before using albuterol inhalation? You should not use this medicine if you are allergic to albuterol. You should not use ProAir RespiClick if you are allergic to milk proteins. Tell your doctor if you have ever had:  · heart disease, high blood pressure;  · a thyroid disorder;  · seizures;  · diabetes; or  · low levels of potassium in your blood. Tell your doctor if you are pregnant or plan to become pregnant. It is not known whether albuterol will harm an unborn baby. However, having uncontrolled asthma during pregnancy may increase the risk of premature birth, low birth weight, or eclampsia (dangerously high blood pressure that can lead to medical problems in both mother and baby). The benefit of preventing bronchospasm may outweigh any risks to the baby. If you are pregnant, your name may be listed on a pregnancy registry to track the effects of albuterol on the baby. It may not be safe to breastfeed while using this medicine. Ask your doctor about any risk. How should I use albuterol inhalation?   Follow all directions on your prescription label and read all medication guides. Use the medicine exactly as directed. Do not allow a young child to use albuterol inhalation without help from an adult. To prevent exercise-induced bronchospasm, use this medicine 15 to 30 minutes before you exercise. The effects of albuterol inhalation should last about 4 to 6 hours. Seek medical attention if your breathing problems get worse quickly, or if you think your asthma medications are not working as well. Read and carefully follow any Instructions for Use provided with your medicine. Ask your doctor or pharmacist if you do not understand these instructions. ProAir HFA, Proventil HFA, or Ventolin HFA must be shaken before each use. You do not need to shake ProAir RespiClick before using. Do not try to clean or take apart the ProAir RespiClick inhaler device. Always use the new inhaler device provided with your refill. Do not float a medicine canister in water to see if it is empty. Your dose needs may change due to surgery, illness, stress, or a recent asthma attack. Do not change your dose or dosing schedule without your doctor's advice. Store at room temperature away from moisture, heat, or cold temperatures. Keep the cover on your ProAir RespiClick inhaler when not in use. Store Proventil or Ventolin with the mouthpiece down. Keep the inhaler canister away from open flame or high heat. The canister may explode if it gets too hot. Do not puncture or burn an empty inhaler canister. What happens if I miss a dose? Use the medicine as soon as you can, but skip the missed dose if it is almost time for your next dose. Do not use two doses at one time. Get your prescription refilled before you run out of medicine completely. What happens if I overdose? Seek emergency medical attention or call the Poison Help line at 1-164.642.1239.  An overdose of albuterol can be fatal.  Overdose symptoms may include dry mouth, tremors, chest pain, fast heartbeats, nausea, general ill feeling, seizure, feeling light-headed or fainting. What should I avoid while using albuterol inhalation? Rinse with water if this medicine gets in your eyes. What are the possible side effects of albuterol inhalation? Get emergency medical help if you have signs of an allergic reaction: hives; difficult breathing; swelling of your face, lips, tongue, or throat. Call your doctor at once if you have:  · wheezing, choking, or other breathing problems after using this medicine;  · chest pain, fast heart rate, pounding heartbeats or fluttering in your chest;  · severe headache, pounding in your neck or ears;  · pain or burning when you urinate;  · high blood sugar --increased thirst, increased urination, dry mouth, fruity breath odor; or  · low potassium --leg cramps, constipation, irregular heartbeats, increased thirst or urination, numbness or tingling, muscle weakness or limp feeling. Common side effects may include:  · chest pain, fast or pounding heartbeats;  · upset stomach, vomiting;  · painful urination;  · dizziness;  · feeling shaky or nervous;  · headache, back pain, body aches; or  · cough, sore throat, sinus pain, runny or stuffy nose. This is not a complete list of side effects and others may occur. Call your doctor for medical advice about side effects. You may report side effects to FDA at 5-708-FDA-4567. What other drugs will affect albuterol inhalation? Tell your doctor about all your other medicines, especially:  · any other inhaled medicines or bronchodilators;  · digoxin;  · a diuretic or \"water pill\";  · an antidepressant --amitriptyline, desipramine, imipramine, doxepin, nortriptyline, and others;  · a beta blocker --atenolol, carvedilol, labetalol, metoprolol, propranolol, sotalol, and others; or  · an MAO inhibitor --isocarboxazid, linezolid, methylene blue injection, phenelzine, rasagiline, selegiline, tranylcypromine, and others. This list is not complete.  Other drugs may affect albuterol inhalation, including prescription and over-the-counter medicines, vitamins, and herbal products. Not all possible drug interactions are listed here. Where can I get more information? Your pharmacist can provide more information about albuterol inhalation. Remember, keep this and all other medicines out of the reach of children, never share your medicines with others, and use this medication only for the indication prescribed. Every effort has been made to ensure that the information provided by [address] is accurate, up-to-date, and complete, but no guarantee is made to that effect. Drug information contained herein may be time sensitive. [facility] information has been compiled for use by healthcare practitioners and consumers in the [place] and therefore [facility] does not warrant that uses outside of the [place] are appropriate, unless specifically indicated otherwise. [facility]'s drug information does not endorse drugs, diagnose patients or recommend therapy. [facility]'s drug information is an informational resource designed to assist licensed healthcare practitioners in caring for their patients and/or to serve consumers viewing this service as a supplement to, and not a substitute for, the expertise, skill, knowledge and judgment of healthcare practitioners. The absence of a warning for a given drug or drug combination in no way should be construed to indicate that the drug or drug combination is safe, effective or appropriate for any given patient. [facility] does not assume any responsibility for any aspect of healthcare administered with the aid of information [facility] provides. The information contained herein is not intended to cover all possible uses, directions, precautions, warnings, drug interactions, allergic reactions, or adverse effects.  If you have questions about the drugs you are taking, check with your doctor, nurse or pharmacist.  Copyright 4003-3785 [facility] [place] Version: 10.01. Revision date: 11/18/2020. Care instructions adapted under license by [facility] ([facility]). If you have questions about a medical condition or this instruction, always ask your healthcare professional. [address] any warranty or liability for your use of this information.

## 2021-11-24 NOTE — PROGRESS NOTES
Subjective  Livingston Hooper, 42 y.o. male presents today with:  Chief Complaint   Patient presents with    1 Month Follow-Up     States he is doing okay. Is no longer using oxygen. Does occasionally have the SOB & chest tightness still. States once hes works 5-6 hrs. he starts feeling an increase in SOB & is fatigued by the end of the shift. HPI     Patient with 1 month follow-up appointment after recent COVID-19 pneumonia. He also has postviral fatigue syndrome. He states that he did go back to work and is doing okay. He states after he has been at work for about 5 or 6 hours he does have increased fatigue. He also notes some shortness of breath but this is improving. He is completely off oxygen at this time. He does have an albuterol inhaler that he uses sometimes once per day, up to 4 times per day. Typically it is either once or twice per day. He did not use this previously. He is scheduled to see pulmonology next month. He is planning to get his Covid vaccine in the coming weeks. He has no other concerns at this time. Review of Systems   Constitutional: Positive for fatigue. Negative for chills, fever and unexpected weight change. HENT: Negative for congestion, rhinorrhea and sore throat. Respiratory: Positive for shortness of breath (Improving). Negative for cough and wheezing. Cardiovascular: Negative for chest pain, palpitations and leg swelling. Gastrointestinal: Negative for abdominal pain, diarrhea, nausea and vomiting. Genitourinary: Negative for difficulty urinating. Musculoskeletal: Negative for arthralgias and joint swelling. Skin: Negative for rash. Neurological: Negative for weakness, light-headedness, numbness and headaches. Psychiatric/Behavioral: Negative for confusion. The patient is not nervous/anxious. Past Medical History:   Diagnosis Date    Bell palsy      History reviewed. No pertinent surgical history.   Current Outpatient Medications Medication Sig Dispense Refill    Cholecalciferol (VITAMIN D3) 50 MCG (2000 UT) CAPS Take by mouth      Ascorbic Acid (VITAMIN C) 250 MG tablet Take 250 mg by mouth daily      albuterol sulfate HFA (VENTOLIN HFA) 108 (90 Base) MCG/ACT inhaler Inhale 2 puffs into the lungs 4 times daily as needed for Wheezing or Shortness of Breath With spacer and instruct on use 18 g 1     No current facility-administered medications for this visit. PMH, Surgical Hx, Family Hx, and Social Hx reviewed and updated. Health Maintenance reviewed. Objective    Vitals:    11/24/21 1021   BP: 112/78   Pulse: 96   Temp: 96.3 °F (35.7 °C)   SpO2: 97%   Weight: 207 lb (93.9 kg)   Height: 5' 7\" (1.702 m)       Physical Exam  Vitals reviewed. Constitutional:       General: He is not in acute distress. HENT:      Head: Normocephalic and atraumatic. Eyes:      Conjunctiva/sclera: Conjunctivae normal.   Neck:      Thyroid: No thyromegaly or thyroid tenderness. Cardiovascular:      Rate and Rhythm: Normal rate and regular rhythm. Heart sounds: No murmur heard. No friction rub. No gallop. Pulmonary:      Effort: Pulmonary effort is normal.      Breath sounds: Normal breath sounds. No wheezing, rhonchi or rales. Abdominal:      General: Bowel sounds are normal. There is no distension. Palpations: Abdomen is soft. Tenderness: There is no abdominal tenderness. Musculoskeletal:         General: No swelling or deformity. Cervical back: Normal range of motion and neck supple. Right lower leg: No edema. Left lower leg: No edema. Lymphadenopathy:      Cervical: No cervical adenopathy. Skin:     General: Skin is warm and dry. Findings: No rash. Neurological:      General: No focal deficit present. Mental Status: He is alert. Gait: Gait is intact. Psychiatric:         Mood and Affect: Mood normal.         Behavior: Behavior normal.        Assessment & Plan     1.  History of COVID-19  Patient with history of COVID-19 about 6 weeks ago. He is improving. He is off oxygen. He is scheduled to see pulmonology next month. He is continuing to use albuterol inhaler as needed. Lung exam is good today. Recommend continuing to build tolerance and working as tolerated. All questions answered. He is going to getting Covid vaccine in the coming weeks. Follow-up in 2 months. 2. Postviral fatigue syndrome  As above. Patient with fatigue syndrome after recent Covid infection. He it does seem to be improving. Continue to monitor. Follow-up with pulmonology as scheduled. No orders of the defined types were placed in this encounter. No orders of the defined types were placed in this encounter. There are no discontinued medications. No follow-ups on file. Reviewed with the patient: current clinical status,medications, activities and diet. Side effects, adverse effects of themedication prescribed today, as well as treatment plan/ rationale and result expectations have been discussed with the patient who expresses understanding and desires to proceed. Close follow up to evaluate treatment results and for coordination of care. I have reviewed the patient's medical history in detail and updated the computerized patient record. Please note, this report has been partially produced using speech recognition software and may cause  and /or contain errors related to that system including grammar, punctuation and spelling as well as words and phrases that may seem inappropriate. If there are questions or concerns please feel free to contact me to clarify.     Wesley Layton, DO

## 2021-12-07 ENCOUNTER — IMMUNIZATION (OUTPATIENT)
Dept: FAMILY MEDICINE CLINIC | Age: 41
End: 2021-12-07
Payer: COMMERCIAL

## 2021-12-07 DIAGNOSIS — Z23 NEED FOR COVID-19 VACCINE: Primary | ICD-10-CM

## 2021-12-07 PROCEDURE — 91301 COVID-19, MODERNA PRIMARY SERIES VACCINE 100MCG/0.5ML DOSE: CPT | Performed by: FAMILY MEDICINE

## 2021-12-07 PROCEDURE — 0011A COVID-19, MODERNA PRIMARY SERIES VACCINE 100MCG/0.5ML DOSE: CPT | Performed by: FAMILY MEDICINE

## 2021-12-14 ENCOUNTER — OFFICE VISIT (OUTPATIENT)
Dept: PULMONOLOGY | Age: 41
End: 2021-12-14
Payer: COMMERCIAL

## 2021-12-14 VITALS
SYSTOLIC BLOOD PRESSURE: 124 MMHG | OXYGEN SATURATION: 98 % | HEART RATE: 78 BPM | WEIGHT: 207 LBS | BODY MASS INDEX: 32.49 KG/M2 | TEMPERATURE: 98.3 F | HEIGHT: 67 IN | DIASTOLIC BLOOD PRESSURE: 83 MMHG

## 2021-12-14 DIAGNOSIS — U07.1 COVID-19: Primary | ICD-10-CM

## 2021-12-14 PROCEDURE — 99204 OFFICE O/P NEW MOD 45 MIN: CPT | Performed by: INTERNAL MEDICINE

## 2021-12-14 ASSESSMENT — ENCOUNTER SYMPTOMS
RHINORRHEA: 0
DIARRHEA: 0
WHEEZING: 0
CHEST TIGHTNESS: 0
COUGH: 1
NAUSEA: 0
EYE ITCHING: 0
SORE THROAT: 0
VOMITING: 0
ABDOMINAL PAIN: 0
SHORTNESS OF BREATH: 1
VOICE CHANGE: 0

## 2021-12-14 NOTE — PROGRESS NOTES
Subjective:     Erma Borges is a 42 y.o. male who complains today of:     Chief Complaint   Patient presents with    New Patient     hx of covid       HPI  He had covid 19  Tested positive on 10/5/21. He was having chills, sweating on 10/1/21 . He felt like having flu like  symptom. He started on  Having shortness of breath and cough  so went to ER on 10/12/21. He had   91-92 % on RA . He went home with O2 and he was on 3-4 weeks. He went home with albuterol and decadron . He had Ct chest done Diffuse bilateral peripheral groundglass opacity. Finding may be seen in patients with covid 19 pneumonia. However, other infectious and inflammatory etiologies may result in a similar radiographic appearance, Bilateral lower lobe subsegmental atelectasis/pneumonia. No CT evidence pulmonary embolism. C/o shortness of breath, almost improved  Occasional dry Cough. Still fatigue  No Hemoptysis. No Chest tightness. No Chest pain with radiation  or pleuritic pain. No  leg edema. No orthopnea. No Fever or chills. No Rhinorrhea and postnasal drip. He is using bronchodilator with albuterol HFA prn     Allergies:  Patient has no known allergies. Past Medical History:   Diagnosis Date    Bell palsy      No past surgical history on file. Family History   Problem Relation Age of Onset    No Known Problems Mother     No Known Problems Father      Social History     Socioeconomic History    Marital status: Single     Spouse name: Not on file    Number of children: Not on file    Years of education: Not on file    Highest education level: Not on file   Occupational History    Not on file   Tobacco Use    Smoking status: Former Smoker     Packs/day: 0.25     Years: 2.00     Pack years: 0.50     Types: Cigarettes     Quit date: 2007     Years since quittin.9    Smokeless tobacco: Never Used   Substance and Sexual Activity    Alcohol use:  Yes     Alcohol/week: 5.0 standard drinks     Types: 6 drink(s) per week    Drug use: Not on file    Sexual activity: Not on file   Other Topics Concern    Not on file   Social History Narrative    Not on file     Social Determinants of Health     Financial Resource Strain: Low Risk     Difficulty of Paying Living Expenses: Not hard at all   Food Insecurity: No Food Insecurity    Worried About Running Out of Food in the Last Year: Never true    [address] in the Last Year: Never true   Transportation Needs: No Transportation Needs    Lack of Transportation (Medical): No    Lack of Transportation (Non-Medical): No   Physical Activity:     Days of Exercise per Week: Not on file    Minutes of Exercise per Session: Not on file   Stress:     Feeling of Stress : Not on file   Social Connections:     Frequency of Communication with Friends and Family: Not on file    Frequency of Social Gatherings with Friends and Family: Not on file    Attends Yazdanism Services: Not on file    Active Member of [address] [place] [address] or Organizations: Not on file    Attends Club or Organization Meetings: Not on file    Marital Status: Not on file   Intimate Partner Violence:     Fear of Current or Ex-Partner: Not on file    Emotionally Abused: Not on file    Physically Abused: Not on file    Sexually Abused: Not on file   Housing Stability:     Unable to Pay for Housing in the Last Year: Not on file    Number of [place] in the Last Year: Not on file    Unstable Housing in the Last Year: Not on file         Review of Systems   Constitutional: Positive for fatigue. Negative for chills, diaphoresis and fever. HENT: Negative for congestion, mouth sores, nosebleeds, postnasal drip, rhinorrhea, sneezing, sore throat and voice change. Eyes: Negative for itching and visual disturbance. Respiratory: Positive for cough and shortness of breath. Negative for chest tightness and wheezing. Cardiovascular: Negative. Negative for chest pain, palpitations and leg swelling. Gastrointestinal: Negative for abdominal pain, diarrhea, nausea and vomiting. Genitourinary: Negative for difficulty urinating and hematuria. Musculoskeletal: Negative for arthralgias, joint swelling and myalgias. Skin: Negative for rash. Allergic/Immunologic: Negative for environmental allergies. Neurological: Negative for dizziness, tremors, weakness and headaches. Psychiatric/Behavioral: Negative for behavioral problems and sleep disturbance.         :     Vitals:    12/14/21 1320   BP: 124/83   Pulse: 78   Temp: 98.3 °F (36.8 °C)   SpO2: 98%   Weight: 207 lb (93.9 kg)   Height: 5' 7\" (1.702 m)     Wt Readings from Last 3 Encounters:   12/14/21 207 lb (93.9 kg)   11/24/21 207 lb (93.9 kg)   10/27/21 196 lb (88.9 kg)         Physical Exam  Constitutional:       Appearance: He is well-developed. HENT:      Head: Normocephalic and atraumatic. Nose: Nose normal.   Eyes:      Conjunctiva/sclera: Conjunctivae normal.      Pupils: Pupils are equal, round, and reactive to light. Neck:      Thyroid: No thyromegaly. Vascular: No JVD. Trachea: No tracheal deviation. Cardiovascular:      Rate and Rhythm: Normal rate and regular rhythm. Heart sounds: No murmur heard. No friction rub. No gallop. Pulmonary:      Effort: Pulmonary effort is normal. No respiratory distress. Breath sounds: Normal breath sounds. No wheezing or rales. Chest:      Chest wall: No tenderness. Abdominal:      General: There is no distension. Musculoskeletal:         General: Normal range of motion. Lymphadenopathy:      Cervical: No cervical adenopathy. Skin:     General: Skin is warm and dry. Findings: No rash. Neurological:      Mental Status: He is alert and oriented to person, place, and time. Cranial Nerves: No cranial nerve deficit.    Psychiatric:         Behavior: Behavior normal.         Current Outpatient Medications   Medication Sig Dispense Refill    Cholecalciferol (VITAMIN D3) 50 MCG (2000 UT) CAPS Take by mouth      Ascorbic Acid (VITAMIN C) 250 MG tablet Take 250 mg by mouth daily      albuterol sulfate HFA (VENTOLIN HFA) 108 (90 Base) MCG/ACT inhaler Inhale 2 puffs into the lungs 4 times daily as needed for Wheezing or Shortness of Breath With spacer and instruct on use 18 g 1     No current facility-administered medications for this visit. Assessment/Plan:     1. COVID-19  He had covid 19  Tested positive on 10/5/21. He was having chills, sweating on 10/1/21 . He felt like having flu like  symptom. He started on  Having shortness of breath and cough  so went to ER on 10/12/21. He had O2 sat 91-92 % on RA . He went home with O2 and he was on 3-4 weeks. He went home with albuterol and decadron . C/o shortness of breath, almost improved. Occasional dry Cough. Still fatigue. He had Ct chest done Diffuse bilateral peripheral groundglass opacity. Finding may be seen in patients with covid 19 pneumonia. However, other infectious and inflammatory etiologies may result in a similar radiographic appearance, Bilateral lower lobe subsegmental atelectasis/pneumonia. No CT evidence pulmonary embolism. He is using bronchodilator with albuterol HFA prn. Continue same. We will request chest x-ray for follow-up. - XR CHEST STANDARD (2 VW); Future      Return in about 4 weeks (around 1/11/2022), or covid 19 pneumonia, for CXR result.       Shruthi Mas MD

## 2022-01-04 ENCOUNTER — IMMUNIZATION (OUTPATIENT)
Dept: FAMILY MEDICINE CLINIC | Age: 42
End: 2022-01-04
Payer: COMMERCIAL

## 2022-01-04 PROCEDURE — 91301 COVID-19, MODERNA PRIMARY SERIES VACCINE 100MCG/0.5ML DOSE: CPT | Performed by: FAMILY MEDICINE

## 2022-01-04 PROCEDURE — 0012A COVID-19, MODERNA PRIMARY SERIES VACCINE 100MCG/0.5ML DOSE: CPT | Performed by: FAMILY MEDICINE

## 2022-01-10 ENCOUNTER — HOSPITAL ENCOUNTER (OUTPATIENT)
Dept: GENERAL RADIOLOGY | Age: 42
Discharge: HOME | End: 2022-01-12
Payer: COMMERCIAL

## 2022-01-10 DIAGNOSIS — U07.1 COVID-19: ICD-10-CM

## 2022-01-10 PROCEDURE — 71046 X-RAY EXAM CHEST 2 VIEWS: CPT

## 2022-01-27 ENCOUNTER — OFFICE VISIT (OUTPATIENT)
Dept: PULMONOLOGY | Age: 42
End: 2022-01-27
Payer: COMMERCIAL

## 2022-01-27 VITALS
BODY MASS INDEX: 33.9 KG/M2 | DIASTOLIC BLOOD PRESSURE: 88 MMHG | WEIGHT: 216 LBS | HEART RATE: 83 BPM | HEIGHT: 67 IN | SYSTOLIC BLOOD PRESSURE: 138 MMHG | OXYGEN SATURATION: 98 % | TEMPERATURE: 98.3 F

## 2022-01-27 DIAGNOSIS — J98.4 SCARRING OF LUNG: Primary | ICD-10-CM

## 2022-01-27 DIAGNOSIS — U07.1 COVID-19: ICD-10-CM

## 2022-01-27 PROCEDURE — 99213 OFFICE O/P EST LOW 20 MIN: CPT | Performed by: INTERNAL MEDICINE

## 2022-01-27 ASSESSMENT — ENCOUNTER SYMPTOMS
DIARRHEA: 0
COUGH: 1
SHORTNESS OF BREATH: 1
WHEEZING: 0
SORE THROAT: 0
RHINORRHEA: 0
VOMITING: 0
CHEST TIGHTNESS: 0
NAUSEA: 0
EYE ITCHING: 0
ABDOMINAL PAIN: 0
VOICE CHANGE: 0

## 2022-01-27 NOTE — PROGRESS NOTES
Subjective:     Mis Fournier is a 42 y.o. male who complains today of:     Chief Complaint   Patient presents with    Pneumonia     CXR result   4 week f/u       HPI  C/o shortness of breath, mild  Cough improved, occasional dry cough . No Hemoptysis. No Chest tightness. No Chest pain with radiation  or pleuritic pain. No  leg edema. No orthopnea. No Fever or chills. No Rhinorrhea and postnasal drip.     He is using bronchodilator with albuterol HFA prn     Allergies:  Patient has no known allergies. Past Medical History:   Diagnosis Date    Bell palsy      No past surgical history on file. Family History   Problem Relation Age of Onset    No Known Problems Mother     No Known Problems Father      Social History     Socioeconomic History    Marital status: Single     Spouse name: Not on file    Number of children: Not on file    Years of education: Not on file    Highest education level: Not on file   Occupational History    Not on file   Tobacco Use    Smoking status: Former Smoker     Packs/day: 0.25     Years: 2.00     Pack years: 0.50     Types: Cigarettes     Quit date: 1/4/2007     Years since quitting: 15.0    Smokeless tobacco: Never Used   Substance and Sexual Activity    Alcohol use: Yes     Alcohol/week: 5.0 standard drinks     Types: 6 drink(s) per week    Drug use: Not on file    Sexual activity: Not on file   Other Topics Concern    Not on file   Social History Narrative    Not on file     Social Determinants of Health     Financial Resource Strain: Low Risk     Difficulty of Paying Living Expenses: Not hard at all   Food Insecurity: No Food Insecurity    Worried About [address] in the Last Year: Never true    [address] in the Last Year: Never true   Transportation Needs: No Transportation Needs    Lack of Transportation (Medical): No    Lack of Transportation (Non-Medical):  No   Physical Activity:     Days of Exercise per Week: Not on file    Minutes of Exercise per Session: Not on file   Stress:     Feeling of Stress : Not on file   Social Connections:     Frequency of Communication with Friends and Family: Not on file    Frequency of Social Gatherings with Friends and Family: Not on file    Attends Congregation Services: Not on file    Active Member of Clubs or Organizations: Not on file    Attends Club or Organization Meetings: Not on file    Marital Status: Not on file   Intimate Partner Violence:     Fear of Current or Ex-Partner: Not on file    Emotionally Abused: Not on file    Physically Abused: Not on file    Sexually Abused: Not on file   Housing Stability:     Unable to Pay for Housing in the Last Year: Not on file    Number of [place] in the Last Year: Not on file    Unstable Housing in the Last Year: Not on file         Review of Systems   Constitutional: Positive for fatigue. Negative for chills, diaphoresis and fever. HENT: Negative for congestion, mouth sores, nosebleeds, postnasal drip, rhinorrhea, sneezing, sore throat and voice change. Eyes: Negative for itching and visual disturbance. Respiratory: Positive for cough and shortness of breath. Negative for chest tightness and wheezing. Cardiovascular: Negative. Negative for chest pain, palpitations and leg swelling. Gastrointestinal: Negative for abdominal pain, diarrhea, nausea and vomiting. Genitourinary: Negative for difficulty urinating and hematuria. Musculoskeletal: Negative for arthralgias, joint swelling and myalgias. Skin: Negative for rash. Allergic/Immunologic: Negative for environmental allergies. Neurological: Negative for dizziness, tremors, weakness and headaches.    Psychiatric/Behavioral: Negative for behavioral problems and sleep disturbance.         :     Vitals:    01/27/22 1203   BP: 138/88   Pulse: 83   Temp: 98.3 °F (36.8 °C)   SpO2: 98%   Weight: 216 lb (98 kg)   Height: 5' 7\" (1.702 m)     Wt Readings from Last 3 Encounters: 01/27/22 216 lb (98 kg)   12/14/21 207 lb (93.9 kg)   11/24/21 207 lb (93.9 kg)         Physical Exam  Constitutional:       Appearance: He is well-developed. HENT:      Head: Normocephalic and atraumatic. Nose: Nose normal.   Eyes:      Conjunctiva/sclera: Conjunctivae normal.      Pupils: Pupils are equal, round, and reactive to light. Neck:      Thyroid: No thyromegaly. Vascular: No JVD. Trachea: No tracheal deviation. Cardiovascular:      Rate and Rhythm: Normal rate and regular rhythm. Heart sounds: No murmur heard. No friction rub. No gallop. Pulmonary:      Effort: Pulmonary effort is normal. No respiratory distress. Breath sounds: Normal breath sounds. No wheezing or rales. Chest:      Chest wall: No tenderness. Abdominal:      General: There is no distension. Musculoskeletal:         General: Normal range of motion. Lymphadenopathy:      Cervical: No cervical adenopathy. Skin:     General: Skin is warm and dry. Findings: No rash. Neurological:      Mental Status: He is alert and oriented to person, place, and time. Cranial Nerves: No cranial nerve deficit. Psychiatric:         Behavior: Behavior normal.         Current Outpatient Medications   Medication Sig Dispense Refill    Cholecalciferol (VITAMIN D3) 50 MCG (2000 UT) CAPS Take by mouth      Ascorbic Acid (VITAMIN C) 250 MG tablet Take 250 mg by mouth daily      albuterol sulfate HFA (VENTOLIN HFA) 108 (90 Base) MCG/ACT inhaler Inhale 2 puffs into the lungs 4 times daily as needed for Wheezing or Shortness of Breath With spacer and instruct on use 18 g 1     No current facility-administered medications for this visit.        Results for orders placed during the hospital encounter of 01/10/22    XR CHEST (2 VW)    Narrative  EXAMINATION: XR CHEST (2 VW)    CLINICAL HISTORY: COVID 19 POSITIVE    COMPARISONS: CT CHEST OCTOBER 12, 2021, CHEST RADIOGRAPH DECEMBER 20, 2011    FINDINGS: Osseous structures intact. Cardiopericardial silhouette normal. Pulmonary vasculature normal. Right diaphragm mildly elevated. Right lung clear. Ill-defined area increased opacity left lower lung anteriorly. Impression  LEFT LOWER LUNG ATELECTASIS/PNEUMONIA. Assessment/Plan:     1. Scarring of lung  He had a chest x-ray done on January 10, 2022 shows right diaphragm is mildly elevated right lungs clear ill-defined area of increased opacity in left lower lung anteriorly suggest left lower lobe atelectasis/pneumonia. C/o shortness of breath, mild. Cough improved, occasional dry cough .  Advised to breathing exercise. Will repeat. Chest x-ray  He is using bronchodilator with albuterol HFA prn     - XR CHEST STANDARD (2 VW); Future    2. COVID-19  He had covid 19  Tested positive on 10/5/21. He had Covid pneumonia with hypoxia he was on O2 for a few weeks. He is doing better now. He has mild short of breath cough almost resolved. No active symptoms of Covid infection. Return in about 4 months (around 5/27/2022) for CXR result.       Marcin Maloney MD

## 2022-02-10 RX ORDER — ALBUTEROL SULFATE 90 UG/1
2 AEROSOL, METERED RESPIRATORY (INHALATION) 4 TIMES DAILY PRN
Qty: 18 G | Refills: 1 | Status: SHIPPED | OUTPATIENT
Start: 2022-02-10

## 2022-02-24 ENCOUNTER — OFFICE VISIT (OUTPATIENT)
Dept: FAMILY MEDICINE CLINIC | Age: 42
End: 2022-02-24
Payer: COMMERCIAL

## 2022-02-24 VITALS
HEART RATE: 88 BPM | DIASTOLIC BLOOD PRESSURE: 74 MMHG | WEIGHT: 222 LBS | BODY MASS INDEX: 33.65 KG/M2 | HEIGHT: 68 IN | OXYGEN SATURATION: 98 % | SYSTOLIC BLOOD PRESSURE: 114 MMHG | TEMPERATURE: 97.1 F

## 2022-02-24 DIAGNOSIS — Z86.16 HISTORY OF COVID-19: Primary | ICD-10-CM

## 2022-02-24 DIAGNOSIS — G93.31 POSTVIRAL FATIGUE SYNDROME: ICD-10-CM

## 2022-02-24 PROCEDURE — 99213 OFFICE O/P EST LOW 20 MIN: CPT | Performed by: STUDENT IN AN ORGANIZED HEALTH CARE EDUCATION/TRAINING PROGRAM

## 2022-02-24 ASSESSMENT — ENCOUNTER SYMPTOMS
ABDOMINAL PAIN: 0
SORE THROAT: 0
EYE DISCHARGE: 0
NAUSEA: 0
DIARRHEA: 0
WHEEZING: 0
SHORTNESS OF BREATH: 0
VOMITING: 0
COUGH: 0
RHINORRHEA: 0

## 2022-02-24 ASSESSMENT — PATIENT HEALTH QUESTIONNAIRE - PHQ9
SUM OF ALL RESPONSES TO PHQ9 QUESTIONS 1 & 2: 1
2. FEELING DOWN, DEPRESSED OR HOPELESS: 1
SUM OF ALL RESPONSES TO PHQ QUESTIONS 1-9: 1
1. LITTLE INTEREST OR PLEASURE IN DOING THINGS: 0
SUM OF ALL RESPONSES TO PHQ QUESTIONS 1-9: 1

## 2022-02-24 NOTE — PROGRESS NOTES
Subjective  Selina Siu, 42 y.o. male presents today with:  Chief Complaint   Patient presents with    3 Month Follow-Up     States he is feeling a lot better. States the past 2 weeks he has been much better. Does still get fatigued, but not like he was before. Did see pulmonology on the 4th. Is using inhaler as needed still. HPI    Patient with 3-month follow-up appointment for history of Covid and post viral fatigue syndrome. He reports that he is overall feeling much better. He states that within the last 2 weeks his fatigue has improved. He still notes some issues with fatigue intermittently. 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 He has been following with pulmonology. He has seen them in about 3 months. He has been negative chest x-ray beforehand. He feels like his breathing is better. Does have an occasional cough. No shortness of breath or wheezing. He does use albuterol inhaler as needed. He states this is working well for him. He has no other concerns at this time. He has been vaccinated. Review of Systems   Constitutional: Negative for chills, fatigue, fever and unexpected weight change. HENT: Negative for congestion, rhinorrhea and sore throat. Eyes: Negative for discharge. Respiratory: Negative for cough, shortness of breath and wheezing. Cardiovascular: Negative for chest pain, palpitations and leg swelling. Gastrointestinal: Negative for abdominal pain, diarrhea, nausea and vomiting. Genitourinary: Negative for difficulty urinating. Musculoskeletal: Negative for arthralgias and joint swelling. Skin: Negative for rash. Neurological: Negative for weakness, light-headedness, numbness and headaches. Psychiatric/Behavioral: Negative for confusion and suicidal ideas. The patient is not nervous/anxious. Past Medical History:   Diagnosis Date    Bell palsy      History reviewed. No pertinent surgical history.   Current Outpatient Medications   Medication Sig Dispense Refill    albuterol sulfate HFA (VENTOLIN HFA) 108 (90 Base) MCG/ACT inhaler Inhale 2 puffs into the lungs 4 times daily as needed for Wheezing or Shortness of Breath With spacer and instruct on use 18 g 1    Cholecalciferol (VITAMIN D3) 50 MCG (2000 UT) CAPS Take by mouth      Ascorbic Acid (VITAMIN C) 250 MG tablet Take 250 mg by mouth daily       No current facility-administered medications for this visit. PMH, Surgical Hx, Family Hx, and Social Hx reviewed and updated. Health Maintenance reviewed. Objective    Vitals:    02/24/22 1029   BP: 114/74   Pulse: 88   Temp: 97.1 °F (36.2 °C)   SpO2: 98%   Weight: 222 lb (100.7 kg)   Height: 5' 8\" (1.727 m)       Physical Exam  Vitals reviewed. Constitutional:       General: He is not in acute distress. HENT:      Head: Normocephalic and atraumatic. Eyes:      Conjunctiva/sclera: Conjunctivae normal.   Neck:      Thyroid: No thyromegaly or thyroid tenderness. Cardiovascular:      Rate and Rhythm: Normal rate and regular rhythm. Heart sounds: No murmur heard. No friction rub. No gallop. Pulmonary:      Effort: Pulmonary effort is normal.      Breath sounds: Normal breath sounds. No wheezing, rhonchi or rales. Abdominal:      General: Bowel sounds are normal. There is no distension. Palpations: Abdomen is soft. Tenderness: There is no abdominal tenderness. Musculoskeletal:         General: No swelling or deformity. Cervical back: Normal range of motion and neck supple. Right lower leg: No edema. Left lower leg: No edema. Lymphadenopathy:      Cervical: No cervical adenopathy. Skin:     General: Skin is warm and dry. Findings: No rash. Neurological:      General: No focal deficit present. Mental Status: He is alert. Gait: Gait is intact. Psychiatric:         Mood and Affect: Mood normal.         Behavior: Behavior normal.        Assessment & Plan     1. History of COVID-19  Patient with history of COVID-19. He is feeling better. Following with pulmonology. Using albuterol as needed. Overall improved. Continue to follow with pulmonology    2. Postviral fatigue syndrome  Improving. Stamina is better. He is able to work out more. Continue to monitor. Follow-up with pulmonology. Continue use albuterol as needed. If symptoms worsen or change, return to care sooner. Patient voiced understanding. All questions answered. Follow schedule. No orders of the defined types were placed in this encounter. No orders of the defined types were placed in this encounter. There are no discontinued medications. Return in about 6 months (around 8/24/2022) for follow up. Reviewed with the patient: current clinical status,medications, activities and diet. 20 minutes spent talking with patient and reviewing chart. Close follow up to evaluate treatment results and for coordination of care. I have reviewed the patient's medical history in detail and updated the computerized patient record. Please note, this report has been partially produced using speech recognition software and may cause  and /or contain errors related to that system including grammar, punctuation and spelling as well as words and phrases that may seem inappropriate. If there are questions or concerns please feel free to contact me to clarify.     Sonal Ramos, DO

## 2022-05-03 ENCOUNTER — HOSPITAL ENCOUNTER (OUTPATIENT)
Dept: GENERAL RADIOLOGY | Age: 42
Discharge: HOME | End: 2022-05-05
Payer: COMMERCIAL

## 2022-05-03 DIAGNOSIS — J98.4 SCARRING OF LUNG: ICD-10-CM

## 2022-05-03 PROCEDURE — 71046 X-RAY EXAM CHEST 2 VIEWS: CPT

## 2022-05-12 ENCOUNTER — TELEPHONE (OUTPATIENT)
Dept: FAMILY MEDICINE CLINIC | Age: 42
End: 2022-05-12

## 2022-05-12 NOTE — TELEPHONE ENCOUNTER
Pt mom called and stated that pt O2 level when he got up was 80 it did go back to the 90s. He does have a pulmonology appt tomorrow. The question the mom had was can he use her CPAP machine tonite to help him until he gets in tomorrow. I did advise that CPAP machine is set for her specific need, states she could adjust settings for him. Could you please advise and call her back [address]. She is on the HIPAA form.

## 2022-05-12 NOTE — TELEPHONE ENCOUNTER
Please let patient and patient's mother know that they should not use the same CPAP machine because of the settings. I would wait to discuss with pulmonologist tomorrow.   Thanks

## 2022-05-12 NOTE — TELEPHONE ENCOUNTER
Spoke with Iva Reeves & informed of message. She stated she is concerned about tonight when he is sleeping since his O2 was so low this morning. Informed her patient should contact pulmonologist today to see what he suggests he do & that she could also take him to the ER. Stated clear understanding.

## 2022-05-13 ENCOUNTER — OFFICE VISIT (OUTPATIENT)
Dept: PULMONOLOGY | Age: 42
End: 2022-05-13
Payer: COMMERCIAL

## 2022-05-13 VITALS
HEIGHT: 68 IN | OXYGEN SATURATION: 98 % | TEMPERATURE: 98.2 F | SYSTOLIC BLOOD PRESSURE: 139 MMHG | DIASTOLIC BLOOD PRESSURE: 69 MMHG | BODY MASS INDEX: 33.34 KG/M2 | WEIGHT: 220 LBS | HEART RATE: 80 BPM

## 2022-05-13 DIAGNOSIS — Z86.16 HISTORY OF COVID-19: Primary | ICD-10-CM

## 2022-05-13 DIAGNOSIS — J98.4 LUNG DENSITY ON X-RAY: ICD-10-CM

## 2022-05-13 DIAGNOSIS — R06.02 SHORTNESS OF BREATH: ICD-10-CM

## 2022-05-13 PROCEDURE — 99214 OFFICE O/P EST MOD 30 MIN: CPT | Performed by: INTERNAL MEDICINE

## 2022-05-13 ASSESSMENT — ENCOUNTER SYMPTOMS
RHINORRHEA: 0
EYE ITCHING: 0
VOMITING: 0
COUGH: 1
ABDOMINAL PAIN: 0
SHORTNESS OF BREATH: 1
CHEST TIGHTNESS: 0
VOICE CHANGE: 0
DIARRHEA: 0
WHEEZING: 0
NAUSEA: 0
SORE THROAT: 0

## 2022-05-13 NOTE — PROGRESS NOTES
Subjective:     Ric Arevalo is a 36 y.o. male who complains today of:     Chief Complaint   Patient presents with    Follow-up     CXR results       HPI  C/o shortness of breath, mild, still using bronchodilator with albuterol HFA prn .  occasional dry cough . He said his O2 sat was low yesterday, today Spo2 98% in office. He said he checked in morning and it was 99%  No Hemoptysis. No Chest tightness. No Chest pain with radiation  or pleuritic pain. No  leg edema. No orthopnea. No Fever or chills. No Rhinorrhea and postnasal drip. He said his O2 sat was low yesterday, today Spo2 98%     CXR done 5/3/22  Ill-defined opacity is again seen in the anterior left lower lobe. This may represents atelectasis or scarring. It is not significantly change from most previous study. Ill-defined opacity was seen on December 20, 2011 but appears larger on this study. Allergies:  Patient has no known allergies. Past Medical History:   Diagnosis Date    Bell palsy      No past surgical history on file. Family History   Problem Relation Age of Onset    No Known Problems Mother     No Known Problems Father      Social History     Socioeconomic History    Marital status: Single     Spouse name: Not on file    Number of children: Not on file    Years of education: Not on file    Highest education level: Not on file   Occupational History    Not on file   Tobacco Use    Smoking status: Former Smoker     Packs/day: 0.25     Years: 2.00     Pack years: 0.50     Types: Cigarettes     Quit date: 1/4/2007     Years since quitting: 15.3    Smokeless tobacco: Never Used   Substance and Sexual Activity    Alcohol use:  Yes     Alcohol/week: 5.0 standard drinks     Types: 6 drink(s) per week    Drug use: Not on file    Sexual activity: Not on file   Other Topics Concern    Not on file   Social History Narrative    Not on file     Social Determinants of Health     Financial Resource Strain: Low Risk     Difficulty of Paying Living Expenses: Not hard at all   Food Insecurity: No Food Insecurity    Worried About Running Out of Food in the Last Year: Never true    Ran Out of Food in the Last Year: Never true   Transportation Needs: No Transportation Needs    Lack of Transportation (Medical): No    Lack of Transportation (Non-Medical): No   Physical Activity:     Days of Exercise per Week: Not on file    Minutes of Exercise per Session: Not on file   Stress:     Feeling of Stress : Not on file   Social Connections:     Frequency of Communication with Friends and Family: Not on file    Frequency of Social Gatherings with Friends and Family: Not on file    Attends [employer] Services: Not on file    Active Member of [address] [place] [address] or Organizations: Not on file    Attends Club or Organization Meetings: Not on file    Marital Status: Not on file   Intimate Partner Violence:     Fear of Current or Ex-Partner: Not on file    Emotionally Abused: Not on file    Physically Abused: Not on file    Sexually Abused: Not on file   Housing Stability:     Unable to Pay for Housing in the Last Year: Not on file    Number of [place] in the Last Year: Not on file    Unstable Housing in the Last Year: Not on file         Review of Systems   Constitutional: Negative for chills, diaphoresis, fatigue and fever. HENT: Negative for congestion, mouth sores, nosebleeds, postnasal drip, rhinorrhea, sneezing, sore throat and voice change. Eyes: Negative for itching and visual disturbance. Respiratory: Positive for cough and shortness of breath. Negative for chest tightness and wheezing. Cardiovascular: Negative. Negative for chest pain, palpitations and leg swelling. Gastrointestinal: Negative for abdominal pain, diarrhea, nausea and vomiting. Genitourinary: Negative for difficulty urinating and hematuria. Musculoskeletal: Negative for arthralgias, joint swelling and myalgias. Skin: Negative for rash. Allergic/Immunologic: Negative for environmental allergies. Neurological: Negative for dizziness, tremors, weakness and headaches. Psychiatric/Behavioral: Negative for behavioral problems and sleep disturbance.         :     Vitals:    05/13/22 0904   BP: 139/69   Pulse: 80   Temp: 98.2 °F (36.8 °C)   SpO2: 98%   Weight: 220 lb (99.8 kg)   Height: 5' 8\" (1.727 m)     Wt Readings from Last 3 Encounters:   05/13/22 220 lb (99.8 kg)   02/24/22 222 lb (100.7 kg)   01/27/22 216 lb (98 kg)         Physical Exam  Constitutional:       Appearance: He is well-developed. He is obese. HENT:      Head: Normocephalic and atraumatic. Nose: Nose normal.   Eyes:      Conjunctiva/sclera: Conjunctivae normal.      Pupils: Pupils are equal, round, and reactive to light. Neck:      Thyroid: No thyromegaly. Vascular: No JVD. Trachea: No tracheal deviation. Cardiovascular:      Rate and Rhythm: Normal rate and regular rhythm. Heart sounds: No murmur heard. No friction rub. No gallop. Pulmonary:      Effort: Pulmonary effort is normal. No respiratory distress. Breath sounds: Normal breath sounds. No wheezing or rales. Comments: diminished Breath sound bilaterally. Chest:      Chest wall: No tenderness. Abdominal:      General: There is no distension. Musculoskeletal:         General: Normal range of motion. Lymphadenopathy:      Cervical: No cervical adenopathy. Skin:     General: Skin is warm and dry. Findings: No rash. Neurological:      Mental Status: He is alert and oriented to person, place, and time. Cranial Nerves: No cranial nerve deficit.    Psychiatric:         Behavior: Behavior normal.         Current Outpatient Medications   Medication Sig Dispense Refill    Multiple Vitamins-Minerals (MULTI ADULT GUMMIES PO) Take by mouth      albuterol sulfate HFA (VENTOLIN HFA) 108 (90 Base) MCG/ACT inhaler Inhale 2 puffs into the lungs 4 times daily as needed for Wheezing or Shortness of Breath With spacer and instruct on use 18 g 1    Cholecalciferol (VITAMIN D3) 50 MCG (2000 UT) CAPS Take by mouth      Ascorbic Acid (VITAMIN C) 250 MG tablet Take 250 mg by mouth daily       No current facility-administered medications for this visit. Results for orders placed during the hospital encounter of 05/03/22    XR CHEST (2 VW)    Narrative  DIAGNOSIS:J98.4 Scarring of lung ICD10    COMPARISON: December 20, 2011: January 10, 2022    TECHNIQUE: PA and lateral chest    FINDINGS: Inspiration is diminished which results in low lung volumes. An ill-defined opacity is again seen in the left lower lobe. It is similar to previous study. This may reflect scarring or atelectasis. The lungs contain no focal infiltrate, pleural  effusion, consolidation or pneumothorax. Cardiac silhouette is stable. Mild degenerative changes are seen in the dorsal spine. Impression  Ill-defined opacity is again seen in the anterior left lower lobe. This may represents atelectasis or scarring. It is not significantly change from most previous study. Ill-defined opacity was seen on December 20, 2011 but appears larger on  this study. Results for orders placed during the hospital encounter of 01/10/22    XR CHEST (2 VW)    Narrative  EXAMINATION: XR CHEST (2 VW)    CLINICAL HISTORY: COVID 19 POSITIVE    COMPARISONS: CT CHEST OCTOBER 12, 2021, CHEST RADIOGRAPH DECEMBER 20, 2011    FINDINGS: Osseous structures intact. Cardiopericardial silhouette normal. Pulmonary vasculature normal. Right diaphragm mildly elevated. Right lung clear. Ill-defined area increased opacity left lower lung anteriorly. Impression  LEFT LOWER LUNG ATELECTASIS/PNEUMONIA. Assessment/Plan:     1. History of COVID-19  C/o shortness of breath, mild, still using bronchodilator with albuterol HFA prn .occasional dry cough . He said his O2 sat was low yesterday, today Spo2 98% in office.  He said he checked in morning and it was 99%. He said his O2 sat was low yesterday, today Spo2 98%     - Full PFT Study With Bronchodilator; Future    2. Lung density on x-ray  CXR done 5/3/22  Ill-defined opacity is again seen in the anterior left lower lobe. This may represents atelectasis or scarring. It is not significantly change from most previous study. Ill-defined opacity was seen on December 20, 2011 but appears larger on this study. Will request CT chest  - CT CHEST WO CONTRAST; Future    3. Shortness of breath  He still having mild short of breath, occasional dry cough. Still using bronchodilator therapy. Return in about 4 weeks (around 6/10/2022) for shortness of breath.       Jacey Reyes MD

## 2022-06-03 ENCOUNTER — HOSPITAL ENCOUNTER (OUTPATIENT)
Dept: CT IMAGING | Age: 42
Discharge: HOME | End: 2022-06-05
Payer: COMMERCIAL

## 2022-06-03 ENCOUNTER — HOSPITAL ENCOUNTER (OUTPATIENT)
Dept: PULMONOLOGY | Age: 42
Discharge: HOME | End: 2022-06-03
Payer: COMMERCIAL

## 2022-06-03 DIAGNOSIS — J98.4 LUNG DENSITY ON X-RAY: ICD-10-CM

## 2022-06-03 DIAGNOSIS — Z86.16 HISTORY OF COVID-19: ICD-10-CM

## 2022-06-03 PROCEDURE — 94060 EVALUATION OF WHEEZING: CPT

## 2022-06-03 PROCEDURE — 94729 DIFFUSING CAPACITY: CPT

## 2022-06-03 PROCEDURE — 6360000002 HC RX W HCPCS: Performed by: INTERNAL MEDICINE

## 2022-06-03 PROCEDURE — 71250 CT THORAX DX C-: CPT

## 2022-06-03 PROCEDURE — 94726 PLETHYSMOGRAPHY LUNG VOLUMES: CPT

## 2022-06-03 RX ORDER — ALBUTEROL SULFATE 2.5 MG/3ML
2.5 SOLUTION RESPIRATORY (INHALATION) ONCE
Status: COMPLETED | OUTPATIENT
Start: 2022-06-03 | End: 2022-06-03

## 2022-06-03 RX ADMIN — ALBUTEROL SULFATE 2.5 MG: 2.5 SOLUTION RESPIRATORY (INHALATION) at 15:37

## 2022-06-07 PROCEDURE — 94726 PLETHYSMOGRAPHY LUNG VOLUMES: CPT | Performed by: INTERNAL MEDICINE

## 2022-06-07 PROCEDURE — 94729 DIFFUSING CAPACITY: CPT | Performed by: INTERNAL MEDICINE

## 2022-06-07 PROCEDURE — 94060 EVALUATION OF WHEEZING: CPT | Performed by: INTERNAL MEDICINE

## 2022-06-07 NOTE — PROCEDURES
[address]                      [facility], [address] [facility]                    PULMONARY FUNCTION  Harpreet Dumontine   36 y.o.   male  Height 66 in  Weight 220 lb      Referring provider   Rafael Arbeu MD    Reading provider   Charles Marmolejo MD    Test meets ATS criteria for acceptability and reproducibility Yes    Diagnosis: GODINEZ: Yes  Cough   Yes, wheezing No  Smoking   quit 16 years ago    Spirometry   FVC            3.70 L   80%  Post bronchodilator 3.67 L  80% Change 0 %  FEV1          3.09 L  84%   Post bronchodilator  3.04 L  83%   Change -1%  FEV1/FVC  83  %             Post bronchodilator  83 %  [id number]% 3.27 L  94%  Post bronchodilator  2.60 L  75%   Change -20%    Lung volume   SVC           3.48 L  75%   RV              1.44 L 86%   TLC            4.92  L 80%   RV/TLC      29 %    DLCO           87 %     Test interpretation     Spirometry is normal with no significant response to bronchodilator  Lung volumes and DLCO are normal       Clinical correlation is recommended     Charles Marmolejo MD [facility], 6/7/2022 12:21 PM

## 2022-06-13 ENCOUNTER — OFFICE VISIT (OUTPATIENT)
Dept: PULMONOLOGY | Age: 42
End: 2022-06-13
Payer: COMMERCIAL

## 2022-06-13 VITALS
WEIGHT: 222 LBS | HEIGHT: 68 IN | BODY MASS INDEX: 33.65 KG/M2 | OXYGEN SATURATION: 98 % | TEMPERATURE: 98.2 F | SYSTOLIC BLOOD PRESSURE: 125 MMHG | HEART RATE: 78 BPM | DIASTOLIC BLOOD PRESSURE: 80 MMHG

## 2022-06-13 DIAGNOSIS — R06.02 SHORTNESS OF BREATH: ICD-10-CM

## 2022-06-13 DIAGNOSIS — G47.30 SLEEP APNEA, UNSPECIFIED TYPE: ICD-10-CM

## 2022-06-13 DIAGNOSIS — Z86.16 HISTORY OF COVID-19: Primary | ICD-10-CM

## 2022-06-13 DIAGNOSIS — J98.4 LUNG DENSITY ON X-RAY: ICD-10-CM

## 2022-06-13 PROCEDURE — 99214 OFFICE O/P EST MOD 30 MIN: CPT | Performed by: INTERNAL MEDICINE

## 2022-06-13 NOTE — PROGRESS NOTES
Subjective:     Otf Rojas is a 36 y.o. male who complains today of:     Chief Complaint   Patient presents with    Follow-up     4 week f/u   CT scan PFT results       HPI  C/o shortness of breath, mild, still using bronchodilator with albuterol HFA prn .  Occasional dry cough . he has O2 sat 98%   No Hemoptysis. No Chest tightness. No Chest pain with radiation  or pleuritic pain. No  leg edema. No orthopnea. No Fever or chills. No Rhinorrhea and postnasal drip    CXR done 5/3/22  Ill-defined opacity is again seen in the anterior left lower lobe. This may represents atelectasis or scarring. It is not significantly change from most previous study. Ill-defined opacity was seen on December 20, 2011 but appears larger on this study. Ct chest 6/3/22   CLEARING OF PREVIOUS BILATERAL INFILTRATES. MINIMAL ATELECTASIS IN THE LEFT LINGULAR AREA. he wakes up gasping for air ,tired when wake no , snore. PSG  Not done     Allergies:  Patient has no known allergies. Past Medical History:   Diagnosis Date    Bell palsy      No past surgical history on file. Family History   Problem Relation Age of Onset    No Known Problems Mother     No Known Problems Father      Social History     Socioeconomic History    Marital status: Single     Spouse name: Not on file    Number of children: Not on file    Years of education: Not on file    Highest education level: Not on file   Occupational History    Not on file   Tobacco Use    Smoking status: Former Smoker     Packs/day: 0.25     Years: 2.00     Pack years: 0.50     Types: Cigarettes     Quit date: 1/4/2007     Years since quitting: 15.4    Smokeless tobacco: Never Used   Substance and Sexual Activity    Alcohol use:  Yes     Alcohol/week: 5.0 standard drinks     Types: 6 drink(s) per week    Drug use: Not on file    Sexual activity: Not on file   Other Topics Concern    Not on file   Social History Narrative    Not on file     Social Determinants of Health     Financial Resource Strain: Low Risk     Difficulty of Paying Living Expenses: Not hard at all   Food Insecurity: No Food Insecurity    Worried About Running Out of Food in the Last Year: Never true    Huber of Food in the Last Year: Never true   Transportation Needs: No Transportation Needs    Lack of Transportation (Medical): No    Lack of Transportation (Non-Medical): No   Physical Activity:     Days of Exercise per Week: Not on file    Minutes of Exercise per Session: Not on file   Stress:     Feeling of Stress : Not on file   Social Connections:     Frequency of Communication with Friends and Family: Not on file    Frequency of Social Gatherings with Friends and Family: Not on file    Attends Druze Services: Not on file    Active Member of [address] [place] [address] or Organizations: Not on file    Attends Club or Organization Meetings: Not on file    Marital Status: Not on file   Intimate Partner Violence:     Fear of Current or Ex-Partner: Not on file    Emotionally Abused: Not on file    Physically Abused: Not on file    Sexually Abused: Not on file   Housing Stability:     Unable to Pay for Housing in the Last Year: Not on file    Number of [place] in the Last Year: Not on file    Unstable Housing in the Last Year: Not on file         Review of Systems      :     Vitals:    06/13/22 1001   BP: 125/80   Pulse: 78   Temp: 98.2 °F (36.8 °C)   SpO2: 98%   Weight: 222 lb (100.7 kg)   Height: 5' 8\" (1.727 m)     Wt Readings from Last 3 Encounters:   06/13/22 222 lb (100.7 kg)   05/13/22 220 lb (99.8 kg)   02/24/22 222 lb (100.7 kg)         Physical Exam  Constitutional:       Appearance: He is well-developed. HENT:      Head: Normocephalic and atraumatic. Nose: Nose normal.      Mouth/Throat:      Comments: Mallampati III   Eyes:      Conjunctiva/sclera: Conjunctivae normal.      Pupils: Pupils are equal, round, and reactive to light.    Neck:      Thyroid: No thyromegaly. Vascular: No JVD. Trachea: No tracheal deviation. Cardiovascular:      Rate and Rhythm: Normal rate and regular rhythm. Heart sounds: No murmur heard. No friction rub. No gallop. Pulmonary:      Effort: Pulmonary effort is normal. No respiratory distress. Breath sounds: Normal breath sounds. No wheezing or rales. Chest:      Chest wall: No tenderness. Abdominal:      General: There is no distension. Musculoskeletal:         General: Normal range of motion. Lymphadenopathy:      Cervical: No cervical adenopathy. Skin:     General: Skin is warm and dry. Findings: No rash. Neurological:      Mental Status: He is alert and oriented to person, place, and time. Cranial Nerves: No cranial nerve deficit. Psychiatric:         Behavior: Behavior normal.         Current Outpatient Medications   Medication Sig Dispense Refill    Multiple Vitamins-Minerals (MULTI ADULT GUMMIES PO) Take by mouth      albuterol sulfate HFA (VENTOLIN HFA) 108 (90 Base) MCG/ACT inhaler Inhale 2 puffs into the lungs 4 times daily as needed for Wheezing or Shortness of Breath With spacer and instruct on use 18 g 1    Cholecalciferol (VITAMIN D3) 50 MCG (2000 UT) CAPS Take by mouth      Ascorbic Acid (VITAMIN C) 250 MG tablet Take 250 mg by mouth daily       No current facility-administered medications for this visit. Results for orders placed during the hospital encounter of 05/03/22    XR CHEST (2 VW)    Narrative  DIAGNOSIS:J98.4 Scarring of lung ICD10    COMPARISON: December 20, 2011: January 10, 2022    TECHNIQUE: PA and lateral chest    FINDINGS: Inspiration is diminished which results in low lung volumes. An ill-defined opacity is again seen in the left lower lobe. It is similar to previous study. This may reflect scarring or atelectasis. The lungs contain no focal infiltrate, pleural  effusion, consolidation or pneumothorax. Cardiac silhouette is stable.  Mild degenerative changes are seen in the dorsal spine. Impression  Ill-defined opacity is again seen in the anterior left lower lobe. This may represents atelectasis or scarring. It is not significantly change from most previous study. Ill-defined opacity was seen on December 20, 2011 but appears larger on  this study. Results for orders placed during the hospital encounter of 01/10/22    XR CHEST (2 VW)    Narrative  EXAMINATION: XR CHEST (2 VW)    CLINICAL HISTORY: COVID 19 POSITIVE    COMPARISONS: CT CHEST OCTOBER 12, 2021, CHEST RADIOGRAPH DECEMBER 20, 2011    FINDINGS: Osseous structures intact. Cardiopericardial silhouette normal. Pulmonary vasculature normal. Right diaphragm mildly elevated. Right lung clear. Ill-defined area increased opacity left lower lung anteriorly. Impression  LEFT LOWER LUNG ATELECTASIS/PNEUMONIA. Results for orders placed during the hospital encounter of 06/03/22    CT CHEST WO CONTRAST    Narrative  EXAMINATION: CT CHEST WO CONTRAST    DATE:6/3/2022 3:58 PM    CLINICAL HISTORY: Anterior chest pain. Shortness of breath. J98.4 Lung density on x-ray ICD10    COMPARISON:  October 12, 2021    TECHNIQUE: Helical CT was performed through the chest without IV contrast., All CT scans at this facility use dose modulation, iterative reconstruction, and/or weight based dosing when appropriate to reduce radiation dose to as low as reasonably  achievable. Some of this report was completed using [employer] 923 [id number] and may include unintended errors with respect to translation of words, typographical errors or grammatical errors which may not have been identified prior  to the finalization of this report. FINDINGS:    Lungs: Minimal subpleural groundglass opacities at the right base with clearing of previous bilateral infiltrates seen on the CT study from October 12, 2021. No fresh infiltrate.  Some minimal atelectatic changes noted at the left lingula that may  account for chest radiographic findings. Pleura:Normal. No effusion or thickening    Mediastinum :Mediastinum and austin are unremarkable. Vessels:Thoracic aorta is intact. Bones:No acute osseous abnormalities. Other:None    Impression  CLEARING OF PREVIOUS BILATERAL INFILTRATES. MINIMAL ATELECTASIS IN THE LEFT LINGULAR AREA. [address]                      [id number] [address]                    PULMONARY FUNCTION  Marlyse Duane Orsine   36 y.o.   male  Height 66 in  Weight 220 lb        Referring provider   Sadiq Valerio MD     Reading provider   Alethea Estrada MD     Test meets ATS criteria for acceptability and reproducibility Yes     Diagnosis: GODINEZ: Yes  Cough   Yes, wheezing No  Smoking   quit 16 years ago     Spirometry   FVC            3.70 L   80%  Post bronchodilator 3.67 L  80% Change 0 %  FEV1          3.09 L  84%   Post bronchodilator  3.04 L  83%   Change -1%  FEV1/FVC  83  %             Post bronchodilator  83 %  TPH35-68% 3.27 L  94%  Post bronchodilator  2.60 L  75%   Change -20%     Lung volume   SVC           3.48 L  75%   RV              1.44 L 86%   TLC            4.92  L 80%   RV/TLC      29 %     DLCO           87 %      Test interpretation     Spirometry is normal with no significant response to bronchodilator  Lung volumes and DLCO are normal        Clinical correlation is recommended      Alethea Estrada MD [facility], 6/7/2022 12:21 PM     Assessment/Plan:     1. History of COVID-19  He is doing much better. C/o shortness of breath, mild, still using bronchodilator with albuterol HFA prn .  Occasional dry cough . he has O2 sat 98%     2. Lung density on x-ray  CXR done 5/3/22  Ill-defined opacity is again seen in the anterior left lower lobe. This may represents atelectasis or scarring. It is not significantly change from most previous study. Ill-defined opacity was seen on December 20, 2011 but appears larger on this study.     Ct chest 6/3/22   CLEARING OF PREVIOUS BILATERAL INFILTRATES. MINIMAL ATELECTASIS IN THE LEFT LINGULAR AREA. 3. Shortness of breath  Is only having mild shortness of breath. A PFT done which she is within normal range. 4. Sleep apnea, unspecified type  e wakes up gasping for air ,tired when wake no , snore. PSG  Not done   - Baseline Diagnostic Sleep Study; Future      Return in about 4 weeks (around 7/11/2022) for shortness of breath, sleep study.       Arthur Knight MD

## 2022-12-09 ENCOUNTER — TELEPHONE (OUTPATIENT)
Dept: FAMILY MEDICINE CLINIC | Age: 42
End: 2022-12-09

## 2022-12-09 NOTE — TELEPHONE ENCOUNTER
----- Message from Isacc Kennedy sent at 12/8/2022  3:32 PM EST -----  Subject: Referral Request    Reason for referral request? Blood work, pt has also had issues with   scheduling sleep study, please advise. Provider patient wants to be referred to(if known):     Provider Phone Number(if known):     Additional Information for Provider?   ---------------------------------------------------------------------------  --------------  [id number] [employer]    [phone]; OK to leave message on voicemail  ---------------------------------------------------------------------------  --------------

## 2023-01-12 ENCOUNTER — HOSPITAL ENCOUNTER (OUTPATIENT)
Dept: SLEEP CENTER | Age: 43
Discharge: HOME | End: 2023-01-14
Payer: COMMERCIAL

## 2023-01-12 PROCEDURE — 95806 SLEEP STUDY UNATT&RESP EFFT: CPT

## 2023-02-04 PROBLEM — G47.33 OSA (OBSTRUCTIVE SLEEP APNEA): Status: ACTIVE | Noted: 2023-02-04

## 2023-02-06 ENCOUNTER — OFFICE VISIT (OUTPATIENT)
Dept: PULMONOLOGY | Age: 43
End: 2023-02-06
Payer: COMMERCIAL

## 2023-02-06 VITALS
HEART RATE: 74 BPM | OXYGEN SATURATION: 97 % | DIASTOLIC BLOOD PRESSURE: 77 MMHG | HEIGHT: 68 IN | WEIGHT: 223 LBS | SYSTOLIC BLOOD PRESSURE: 120 MMHG | BODY MASS INDEX: 33.8 KG/M2 | TEMPERATURE: 98 F

## 2023-02-06 DIAGNOSIS — E66.9 OBESITY (BMI 30-39.9): ICD-10-CM

## 2023-02-06 DIAGNOSIS — G47.33 OSA (OBSTRUCTIVE SLEEP APNEA): Primary | ICD-10-CM

## 2023-02-06 DIAGNOSIS — Z86.16 HISTORY OF COVID-19: ICD-10-CM

## 2023-02-06 PROCEDURE — 99214 OFFICE O/P EST MOD 30 MIN: CPT | Performed by: INTERNAL MEDICINE

## 2023-02-06 ASSESSMENT — ENCOUNTER SYMPTOMS
DIARRHEA: 0
APNEA: 1
SORE THROAT: 0
CHEST TIGHTNESS: 0
VOICE CHANGE: 0
WHEEZING: 0
ABDOMINAL PAIN: 0
NAUSEA: 0
VOMITING: 0
SHORTNESS OF BREATH: 0
COUGH: 0
RHINORRHEA: 0
EYE ITCHING: 0

## 2023-02-06 NOTE — PROGRESS NOTES
Subjective:     Hilda Khanna is a 36 y.o. male who complains today of:     Chief Complaint   Patient presents with    Discuss Labs     Sleep Study Results       HPI      He had sleep study done and came for f/u for result   He has HST done on  23  an shows STEFFEN. AHI 28.9   CPAP titration study not done     He is complaining of snoring and daytime sleepiness and tiredness. C/o witness apnea as per girl friend . He wakes up with gasping for air. C/o wakes up frequently during sleep . No complaint of morning headache. He does not have restful sleep. He does take daily naps. He does fall asleep while watching TV. He does not have a complaint of sleepiness while driving. He does not have difficulty falling sleep or staying asleep  No significant Weight gain in last 6-12 month     No sleep walking,  talking or eating . No sleep onset hallucination. No sleep paralysis , No sleep attacks. C/o shortness of breath,  resolved , using bronchodilator with albuterol HFA prn .  he has O2 sat 97%   No Hemoptysis. No Chest tightness. No Chest pain with radiation  or pleuritic pain. No  leg edema. No orthopnea. No Fever or chills. No Rhinorrhea and postnasal drip       Allergies:  Patient has no known allergies. Past Medical History:   Diagnosis Date    Bell palsy      History reviewed. No pertinent surgical history.   Family History   Problem Relation Age of Onset    No Known Problems Mother     No Known Problems Father      Social History     Socioeconomic History    Marital status: Single     Spouse name: Not on file    Number of children: Not on file    Years of education: Not on file    Highest education level: Not on file   Occupational History    Not on file   Tobacco Use    Smoking status: Former     Packs/day: 0.25     Years: 2.00     Pack years: 0.50     Types: Cigarettes     Quit date: 2007     Years since quittin.1     Passive exposure: Past    Smokeless tobacco: Never   Vaping Use    Vaping Use: Never used   Substance and Sexual Activity    Alcohol use: Yes     Alcohol/week: 5.0 standard drinks     Types: 6 drink(s) per week    Drug use: Not Currently     Types: Marijuana Velvet Baller)    Sexual activity: Not on file   Other Topics Concern    Not on file   Social History Narrative    Not on file     Social Determinants of Health     Financial Resource Strain: Not on file   Food Insecurity: Not on file   Transportation Needs: Not on file   Physical Activity: Not on file   Stress: Not on file   Social Connections: Not on file   Intimate Partner Violence: Not on file   Housing Stability: Not on file         Review of Systems   Constitutional:  Negative for chills, diaphoresis, fatigue and fever. HENT:  Negative for congestion, mouth sores, nosebleeds, postnasal drip, rhinorrhea, sneezing, sore throat and voice change. Snoring    Eyes:  Negative for itching and visual disturbance. Respiratory:  Positive for apnea. Negative for cough, chest tightness, shortness of breath and wheezing. Cardiovascular: Negative. Negative for chest pain, palpitations and leg swelling. Gastrointestinal:  Negative for abdominal pain, diarrhea, nausea and vomiting. Genitourinary:  Negative for difficulty urinating and hematuria. Musculoskeletal:  Negative for arthralgias, joint swelling and myalgias. Skin:  Negative for rash. Allergic/Immunologic: Negative for environmental allergies. Neurological:  Negative for dizziness, tremors, weakness and headaches. Psychiatric/Behavioral:  Positive for sleep disturbance. Negative for behavioral problems.         :     Vitals:    02/06/23 1128   BP: 120/77   Site: Right Upper Arm   Position: Sitting   Cuff Size: Medium Adult   Pulse: 74   Temp: 98 °F (36.7 °C)   TempSrc: Temporal   SpO2: 97%   Weight: 223 lb (101.2 kg)   Height: 5' 7.5\" (1.715 m)     Wt Readings from Last 3 Encounters:   02/06/23 223 lb (101.2 kg)   06/13/22 222 lb (100.7 kg)   05/13/22 220 lb (99.8 kg)         Physical Exam  Constitutional:       Appearance: He is well-developed. He is obese. HENT:      Head: Normocephalic and atraumatic. Nose: Nose normal.      Mouth/Throat:      Comments: Narrow airway  Eyes:      Conjunctiva/sclera: Conjunctivae normal.      Pupils: Pupils are equal, round, and reactive to light. Neck:      Thyroid: No thyromegaly. Vascular: No JVD. Trachea: No tracheal deviation. Cardiovascular:      Rate and Rhythm: Normal rate and regular rhythm. Heart sounds: No murmur heard. No friction rub. No gallop. Pulmonary:      Effort: Pulmonary effort is normal. No respiratory distress. Breath sounds: Normal breath sounds. No wheezing or rales. Chest:      Chest wall: No tenderness. Abdominal:      General: There is no distension. Musculoskeletal:         General: Normal range of motion. Lymphadenopathy:      Cervical: No cervical adenopathy. Skin:     General: Skin is warm and dry. Findings: No rash. Neurological:      Mental Status: He is alert and oriented to person, place, and time. Cranial Nerves: No cranial nerve deficit. Psychiatric:         Behavior: Behavior normal.       Current Outpatient Medications   Medication Sig Dispense Refill    Multiple Vitamins-Minerals (MULTI ADULT GUMMIES PO) Take by mouth      albuterol sulfate HFA (VENTOLIN HFA) 108 (90 Base) MCG/ACT inhaler Inhale 2 puffs into the lungs 4 times daily as needed for Wheezing or Shortness of Breath With spacer and instruct on use 18 g 1    Cholecalciferol (VITAMIN D3) 50 MCG (2000 UT) CAPS Take by mouth      Ascorbic Acid (VITAMIN C) 250 MG tablet Take 250 mg by mouth daily       No current facility-administered medications for this visit.        Results for orders placed during the hospital encounter of 05/03/22    XR CHEST (2 VW)    Narrative  DIAGNOSIS:J98.4 Scarring of lung ICD10    COMPARISON: December 20, 2011: January 10, 2022    TECHNIQUE: PA and lateral chest    FINDINGS: Inspiration is diminished which results in low lung volumes. An ill-defined opacity is again seen in the left lower lobe. It is similar to previous study. This may reflect scarring or atelectasis. The lungs contain no focal infiltrate, pleural  effusion, consolidation or pneumothorax. Cardiac silhouette is stable. Mild degenerative changes are seen in the dorsal spine. Impression  Ill-defined opacity is again seen in the anterior left lower lobe. This may represents atelectasis or scarring. It is not significantly change from most previous study. Ill-defined opacity was seen on December 20, 2011 but appears larger on  this study. Results for orders placed during the hospital encounter of 01/10/22    XR CHEST (2 VW)    Narrative  EXAMINATION: XR CHEST (2 VW)    CLINICAL HISTORY: COVID 19 POSITIVE    COMPARISONS: CT CHEST OCTOBER 12, 2021, CHEST RADIOGRAPH DECEMBER 20, 2011    FINDINGS: Osseous structures intact. Cardiopericardial silhouette normal. Pulmonary vasculature normal. Right diaphragm mildly elevated. Right lung clear. Ill-defined area increased opacity left lower lung anteriorly. Impression  LEFT LOWER LUNG ATELECTASIS/PNEUMONIA. Results for orders placed during the hospital encounter of 06/03/22    CT CHEST WO CONTRAST    Narrative  EXAMINATION: CT CHEST WO CONTRAST    DATE:6/3/2022 3:58 PM    CLINICAL HISTORY: Anterior chest pain. Shortness of breath. J98.4 Lung density on x-ray ICD10    COMPARISON:  October 12, 2021    TECHNIQUE: Helical CT was performed through the chest without IV contrast., All CT scans at this facility use dose modulation, iterative reconstruction, and/or weight based dosing when appropriate to reduce radiation dose to as low as reasonably  achievable.  Some of this report was completed using [employer] [id number] and may include unintended errors with respect to translation of words, typographical errors or grammatical errors which may not have been identified prior  to the finalization of this report. FINDINGS:    Lungs: Minimal subpleural groundglass opacities at the right base with clearing of previous bilateral infiltrates seen on the CT study from October 12, 2021. No fresh infiltrate. Some minimal atelectatic changes noted at the left lingula that may  account for chest radiographic findings. Pleura:Normal. No effusion or thickening    Mediastinum :Mediastinum and austin are unremarkable. Vessels:Thoracic aorta is intact. Bones:No acute osseous abnormalities. Other:None    Impression  CLEARING OF PREVIOUS BILATERAL INFILTRATES. MINIMAL ATELECTASIS IN THE LEFT LINGULAR AREA. Assessment/Plan:     1. STEFFEN (obstructive sleep apnea)  He had sleep study done and came for f/u for result   He has HST done on  1/12/23  an shows STEFFEN. AHI 28.9 He is complaining of snoring and daytime sleepiness and tiredness. C/o witness apnea as per girl friend . He wakes up with gasping for air. C/o wakes up frequently during sleep . He does not have restful sleep. He does take daily naps. He does fall asleep while watching TV. CPAP study is requested     - Sleep Study with PAP Titration    2. Obesity (BMI 30-39. 9)  He is advised try to lose weight. obesity related risk explained to the patient ,  Current weight:  223 lb (101.2 kg) Lbs. BMI:  Body mass index is 34.41 kg/m². Suggested weight control approaches, including dietary changes , exercise, behavioral modification. 3. History of COVID-19 oct 2021  He is doing better , shortness of breath almost resolved . He is using albuterol HFA     Return in about 4 weeks (around 3/6/2023) for steffen, sleep study.       Leslie Beach MD

## 2023-03-13 ENCOUNTER — OFFICE VISIT (OUTPATIENT)
Dept: PULMONOLOGY | Age: 43
End: 2023-03-13
Payer: COMMERCIAL

## 2023-03-13 VITALS
DIASTOLIC BLOOD PRESSURE: 84 MMHG | HEART RATE: 68 BPM | TEMPERATURE: 96.8 F | BODY MASS INDEX: 34.72 KG/M2 | WEIGHT: 225 LBS | SYSTOLIC BLOOD PRESSURE: 130 MMHG | OXYGEN SATURATION: 96 %

## 2023-03-13 DIAGNOSIS — E66.9 OBESITY (BMI 30-39.9): ICD-10-CM

## 2023-03-13 DIAGNOSIS — G47.33 OSA (OBSTRUCTIVE SLEEP APNEA): Primary | ICD-10-CM

## 2023-03-13 PROCEDURE — 99214 OFFICE O/P EST MOD 30 MIN: CPT | Performed by: INTERNAL MEDICINE

## 2023-03-13 ASSESSMENT — ENCOUNTER SYMPTOMS
SHORTNESS OF BREATH: 0
NAUSEA: 0
EYE ITCHING: 0
ABDOMINAL PAIN: 0
RHINORRHEA: 0
VOMITING: 0
CHEST TIGHTNESS: 0
VOICE CHANGE: 0
WHEEZING: 0
DIARRHEA: 0
SORE THROAT: 0
COUGH: 0

## 2023-03-13 NOTE — PROGRESS NOTES
Subjective:     Kimi Jose is a 36 y.o. male who complains today of:     Chief Complaint   Patient presents with    Follow-up     1m f/u for STEFFEN. Insurance won't pay for SS with titration        HPI  He had sleep study done and came for f/u for result . He has HST done on  23  an shows STEFFEN. AHI 28.9 . CPAP titration study not done , he said insurance denied for it     He is complaining of snoring and daytime sleepiness and tiredness. C/o witness apnea as per girl friend . He wakes up with gasping for air. C/o wakes up   He does not have restful sleep. He does take daily naps. He does fall asleep while watching TV. He does not have a complaint of sleepiness while driving. He does not have difficulty falling sleep or staying asleep  No significant Weight gain in last 6-12 month         C/o shortness of breath, improved , no wheezing , no coughing. using bronchodilator with albuterol HFA prn.he has O2 sat 97% No Chest tightness. No Chest pain with radiation  or pleuritic pain. No  leg edema. No orthopnea. No Fever or chills. No Rhinorrhea and postnasal drip    Allergies:  Patient has no known allergies. Past Medical History:   Diagnosis Date    Bell palsy      No past surgical history on file. Family History   Problem Relation Age of Onset    No Known Problems Mother     No Known Problems Father      Social History     Socioeconomic History    Marital status: Single     Spouse name: Not on file    Number of children: Not on file    Years of education: Not on file    Highest education level: Not on file   Occupational History    Not on file   Tobacco Use    Smoking status: Former     Packs/day: 0.25     Years: 2.00     Pack years: 0.50     Types: Cigarettes     Quit date: 2007     Years since quittin.1     Passive exposure: Past    Smokeless tobacco: Never   Vaping Use    Vaping Use: Never used   Substance and Sexual Activity    Alcohol use:  Yes     Alcohol/week: 5.0 standard drinks Types: 6 drink(s) per week    Drug use: Not Currently     Types: Marijuana [employer])    Sexual activity: Not on file   Other Topics Concern    Not on file   Social History Narrative    Not on file     Social Determinants of Health     Financial Resource Strain: Not on file   Food Insecurity: Not on file   Transportation Needs: Not on file   Physical Activity: Not on file   Stress: Not on file   Social Connections: Not on file   Intimate Partner Violence: Not on file   Housing Stability: Not on file         Review of Systems   Constitutional:  Negative for chills, diaphoresis, fatigue and fever. HENT:  Negative for congestion, mouth sores, nosebleeds, postnasal drip, rhinorrhea, sneezing, sore throat and voice change. Eyes:  Negative for itching and visual disturbance. Respiratory:  Negative for cough, chest tightness, shortness of breath and wheezing. Cardiovascular: Negative. Negative for chest pain, palpitations and leg swelling. Gastrointestinal:  Negative for abdominal pain, diarrhea, nausea and vomiting. Genitourinary:  Negative for difficulty urinating and hematuria. Musculoskeletal:  Negative for arthralgias, joint swelling and myalgias. Skin:  Negative for rash. Allergic/Immunologic: Negative for environmental allergies. Neurological:  Negative for dizziness, tremors, weakness and headaches. Psychiatric/Behavioral:  Positive for sleep disturbance. Negative for behavioral problems. :     Vitals:    03/13/23 0926   BP: 130/84   Site: Right Upper Arm   Position: Sitting   Cuff Size: Medium Adult   Pulse: 68   Temp: 96.8 °F (36 °C)   SpO2: 96%   Weight: 225 lb (102.1 kg)     Wt Readings from Last 3 Encounters:   03/13/23 225 lb (102.1 kg)   02/06/23 223 lb (101.2 kg)   06/13/22 222 lb (100.7 kg)         Physical Exam  Constitutional:       Appearance: He is well-developed. He is obese. HENT:      Head: Normocephalic and atraumatic.       Nose: Nose normal.   Eyes: Conjunctiva/sclera: Conjunctivae normal.      Pupils: Pupils are equal, round, and reactive to light. Neck:      Thyroid: No thyromegaly. Vascular: No JVD. Trachea: No tracheal deviation. Cardiovascular:      Rate and Rhythm: Normal rate and regular rhythm. Heart sounds: No murmur heard. No friction rub. No gallop. Pulmonary:      Effort: Pulmonary effort is normal. No respiratory distress. Breath sounds: Normal breath sounds. No wheezing or rales. Chest:      Chest wall: No tenderness. Abdominal:      General: There is no distension. Musculoskeletal:         General: Normal range of motion. Lymphadenopathy:      Cervical: No cervical adenopathy. Skin:     General: Skin is warm and dry. Findings: No rash. Neurological:      Mental Status: He is alert and oriented to person, place, and time. Cranial Nerves: No cranial nerve deficit. Psychiatric:         Behavior: Behavior normal.       Current Outpatient Medications   Medication Sig Dispense Refill    CPAP Machine MISC by Does not apply route New cpap 5-15 CM     PLEASE GIVE ASAP, AHI 28.9 WITH SYMPTOMS 1 each 0    Multiple Vitamins-Minerals (MULTI ADULT GUMMIES PO) Take by mouth      albuterol sulfate HFA (VENTOLIN HFA) 108 (90 Base) MCG/ACT inhaler Inhale 2 puffs into the lungs 4 times daily as needed for Wheezing or Shortness of Breath With spacer and instruct on use 18 g 1    Cholecalciferol (VITAMIN D3) 50 MCG (2000 UT) CAPS Take by mouth      Ascorbic Acid (VITAMIN C) 250 MG tablet Take 250 mg by mouth daily       No current facility-administered medications for this visit. Results for orders placed during the hospital encounter of 05/03/22    XR CHEST (2 VW)    Narrative  DIAGNOSIS:J98.4 Scarring of lung ICD10    COMPARISON: December 20, 2011: January 10, 2022    TECHNIQUE: PA and lateral chest    FINDINGS: Inspiration is diminished which results in low lung volumes.  An ill-defined opacity is again seen in the left lower lobe. It is similar to previous study. This may reflect scarring or atelectasis. The lungs contain no focal infiltrate, pleural  effusion, consolidation or pneumothorax. Cardiac silhouette is stable. Mild degenerative changes are seen in the dorsal spine. Impression  Ill-defined opacity is again seen in the anterior left lower lobe. This may represents atelectasis or scarring. It is not significantly change from most previous study. Ill-defined opacity was seen on December 20, 2011 but appears larger on  this study. Results for orders placed during the hospital encounter of 01/10/22    XR CHEST (2 VW)    Narrative  EXAMINATION: XR CHEST (2 VW)    CLINICAL HISTORY: COVID 19 POSITIVE    COMPARISONS: CT CHEST OCTOBER 12, 2021, CHEST RADIOGRAPH DECEMBER 20, 2011    FINDINGS: Osseous structures intact. Cardiopericardial silhouette normal. Pulmonary vasculature normal. Right diaphragm mildly elevated. Right lung clear. Ill-defined area increased opacity left lower lung anteriorly. Impression  LEFT LOWER LUNG ATELECTASIS/PNEUMONIA.  ]  No results found for this or any previous visit.  ]  No results found for this or any previous visit.  ]  Results for orders placed during the hospital encounter of 06/03/22    CT CHEST WO CONTRAST    Narrative  EXAMINATION: CT CHEST WO CONTRAST    DATE:6/3/2022 3:58 PM    CLINICAL HISTORY: Anterior chest pain. Shortness of breath. J98.4 Lung density on x-ray ICD10    COMPARISON:  October 12, 2021    TECHNIQUE: Helical CT was performed through the chest without IV contrast., All CT scans at this facility use dose modulation, iterative reconstruction, and/or weight based dosing when appropriate to reduce radiation dose to as low as reasonably  achievable.  Some of this report was completed using Power Scribe [id number] and may include unintended errors with respect to translation of words, typographical errors or grammatical errors which may not have been identified prior  to the finalization of this report. FINDINGS:    Lungs: Minimal subpleural groundglass opacities at the right base with clearing of previous bilateral infiltrates seen on the CT study from October 12, 2021. No fresh infiltrate. Some minimal atelectatic changes noted at the left lingula that may  account for chest radiographic findings. Pleura:Normal. No effusion or thickening    Mediastinum :Mediastinum and [place] are unremarkable. Vessels:Thoracic aorta is intact. Bones:No acute osseous abnormalities. Other:None    Impression  CLEARING OF PREVIOUS BILATERAL INFILTRATES. MINIMAL ATELECTASIS IN THE LEFT LINGULAR AREA. Assessment/Plan:     1. STEFFEN (obstructive sleep apnea)  He had sleep study done and came for f/u for result . He has HST done on  1/12/23  an shows STEFFEN. AHI 28.9 . CPAP titration study not done , he said insurance denied for it   He is complaining of snoring and daytime sleepiness and tiredness. C/o witness apnea as per girl friend . He wakes up with gasping for air. C/o wakes up He does not have restful sleep. He does take daily naps. He does fall asleep while watching TV.   - CPAP Machine MISC; by Does not apply route New cpap 5-15 CM     PLEASE GIVE ASAP, AHI 28.9 WITH SYMPTOMS  Dispense: 1 each; Refill: 0    Counseling: CPAP/BiPAP uses, He advised to use CPAP at least 5-6 hours every night. Driving: He is advised for extreme caution when driving or operating machinery if there is a feeling of drowsiness, especially while driving it is preferable to stop driving and take a brief nap. Sleep hygiene:Avoid supine sleep, sleep on  sides. Avoid  sleep deprivation. Explained sleep hygiene. Advice to avoid Alcohol and sedative    2. Obesity (BMI 30-39. 9)  He is advised try to lose weight. obesity related risk explained to the patient ,  Current weight:  225 lb (102.1 kg) Lbs. BMI:  Body mass index is 34.72 kg/m².   Suggested weight control approaches, including dietary changes , exercise, behavioral modification. Return in about 6 weeks (around 4/24/2023).       Glenny Melgar MD

## 2023-03-23 DIAGNOSIS — Z13.1 SCREENING FOR DIABETES MELLITUS (DM): ICD-10-CM

## 2023-03-23 DIAGNOSIS — Z13.1 SCREENING FOR DIABETES MELLITUS (DM): Primary | ICD-10-CM

## 2023-03-23 DIAGNOSIS — Z13.6 ENCOUNTER FOR LIPID SCREENING FOR CARDIOVASCULAR DISEASE: ICD-10-CM

## 2023-03-23 DIAGNOSIS — Z13.220 ENCOUNTER FOR LIPID SCREENING FOR CARDIOVASCULAR DISEASE: ICD-10-CM

## 2023-03-23 LAB
CHOLEST SERPL-MCNC: 217 MG/DL (ref 0–199)
GLUCOSE FASTING: 118 MG/DL (ref 70–99)
HDLC SERPL-MCNC: 45 MG/DL (ref 40–59)
LDL CHOLESTEROL CALCULATED: 134 MG/DL (ref 0–129)
TRIGLYCERIDE, FASTING: 188 MG/DL (ref 0–150)

## 2023-03-27 ENCOUNTER — OFFICE VISIT (OUTPATIENT)
Dept: FAMILY MEDICINE CLINIC | Age: 43
End: 2023-03-27
Payer: COMMERCIAL

## 2023-03-27 VITALS
TEMPERATURE: 97.7 F | SYSTOLIC BLOOD PRESSURE: 132 MMHG | HEART RATE: 85 BPM | BODY MASS INDEX: 33.65 KG/M2 | OXYGEN SATURATION: 99 % | DIASTOLIC BLOOD PRESSURE: 89 MMHG | HEIGHT: 68 IN | WEIGHT: 222 LBS

## 2023-03-27 DIAGNOSIS — R73.01 ELEVATED FASTING GLUCOSE: ICD-10-CM

## 2023-03-27 DIAGNOSIS — F32.A ANXIETY AND DEPRESSION: Primary | ICD-10-CM

## 2023-03-27 DIAGNOSIS — R73.03 PREDIABETES: ICD-10-CM

## 2023-03-27 DIAGNOSIS — G47.33 OSA (OBSTRUCTIVE SLEEP APNEA): ICD-10-CM

## 2023-03-27 DIAGNOSIS — F41.9 ANXIETY AND DEPRESSION: Primary | ICD-10-CM

## 2023-03-27 LAB — HBA1C MFR BLD: 6 %

## 2023-03-27 PROCEDURE — 83036 HEMOGLOBIN GLYCOSYLATED A1C: CPT | Performed by: STUDENT IN AN ORGANIZED HEALTH CARE EDUCATION/TRAINING PROGRAM

## 2023-03-27 PROCEDURE — 99214 OFFICE O/P EST MOD 30 MIN: CPT | Performed by: STUDENT IN AN ORGANIZED HEALTH CARE EDUCATION/TRAINING PROGRAM

## 2023-03-27 RX ORDER — HYDROXYZINE PAMOATE 25 MG/1
25 CAPSULE ORAL 3 TIMES DAILY PRN
Qty: 90 CAPSULE | Refills: 0 | Status: SHIPPED | OUTPATIENT
Start: 2023-03-27 | End: 2023-04-26

## 2023-03-27 SDOH — ECONOMIC STABILITY: HOUSING INSECURITY
IN THE LAST 12 MONTHS, WAS THERE A TIME WHEN YOU DID NOT HAVE A STEADY PLACE TO SLEEP OR SLEPT IN A SHELTER (INCLUDING NOW)?: NO

## 2023-03-27 SDOH — ECONOMIC STABILITY: FOOD INSECURITY: WITHIN THE PAST 12 MONTHS, YOU WORRIED THAT YOUR FOOD WOULD RUN OUT BEFORE YOU GOT MONEY TO BUY MORE.: NEVER TRUE

## 2023-03-27 SDOH — ECONOMIC STABILITY: FOOD INSECURITY: WITHIN THE PAST 12 MONTHS, THE FOOD YOU BOUGHT JUST DIDN'T LAST AND YOU DIDN'T HAVE MONEY TO GET MORE.: NEVER TRUE

## 2023-03-27 SDOH — ECONOMIC STABILITY: INCOME INSECURITY: HOW HARD IS IT FOR YOU TO PAY FOR THE VERY BASICS LIKE FOOD, HOUSING, MEDICAL CARE, AND HEATING?: NOT VERY HARD

## 2023-03-27 ASSESSMENT — PATIENT HEALTH QUESTIONNAIRE - PHQ9
10. IF YOU CHECKED OFF ANY PROBLEMS, HOW DIFFICULT HAVE THESE PROBLEMS MADE IT FOR YOU TO DO YOUR WORK, TAKE CARE OF THINGS AT HOME, OR GET ALONG WITH OTHER PEOPLE: 1
9. THOUGHTS THAT YOU WOULD BE BETTER OFF DEAD, OR OF HURTING YOURSELF: 0
6. FEELING BAD ABOUT YOURSELF - OR THAT YOU ARE A FAILURE OR HAVE LET YOURSELF OR YOUR FAMILY DOWN: 3
SUM OF ALL RESPONSES TO PHQ QUESTIONS 1-9: 18
SUM OF ALL RESPONSES TO PHQ9 QUESTIONS 1 & 2: 4
2. FEELING DOWN, DEPRESSED OR HOPELESS: 3
7. TROUBLE CONCENTRATING ON THINGS, SUCH AS READING THE NEWSPAPER OR WATCHING TELEVISION: 2
1. LITTLE INTEREST OR PLEASURE IN DOING THINGS: 1
3. TROUBLE FALLING OR STAYING ASLEEP: 3
4. FEELING TIRED OR HAVING LITTLE ENERGY: 3
5. POOR APPETITE OR OVEREATING: 1
8. MOVING OR SPEAKING SO SLOWLY THAT OTHER PEOPLE COULD HAVE NOTICED. OR THE OPPOSITE, BEING SO FIGETY OR RESTLESS THAT YOU HAVE BEEN MOVING AROUND A LOT MORE THAN USUAL: 2
SUM OF ALL RESPONSES TO PHQ QUESTIONS 1-9: 18

## 2023-03-27 ASSESSMENT — ENCOUNTER SYMPTOMS
DIARRHEA: 0
VOMITING: 0
NAUSEA: 0
ABDOMINAL PAIN: 0
RHINORRHEA: 0
SHORTNESS OF BREATH: 0
WHEEZING: 0
EYE DISCHARGE: 0
SORE THROAT: 0
COUGH: 0

## 2023-03-27 NOTE — PROGRESS NOTES
Nearly every day  PHQ-2 Score: 4  PHQ-9 Over the past 2 weeks, how often have you been bothered by any of the following problems? Trouble falling or staying asleep, or sleeping too much: Nearly every day  Feeling tired or having little energy: Nearly every day  Poor appetite or overeating: Several days  Feeling bad about yourself - or that you are a failure or have let yourself or your family down: Nearly every day  Trouble concentrating on things, such as reading the newspaper or watching television: More than half the days  Moving or speaking so slowly that other people could have noticed. Or the opposite - being so fidgety or restless that you have been moving around a lot more than usual: More than half the days  Thoughts that you would be better off dead, or of hurting yourself in some way: Not at all  If you checked off any problems, how difficult have these problems made it for you to do your work, take care of things at home, or get along with other people?: Somewhat difficult  PHQ-9 Total Score: 18  PHQ-9 Total Score: 18    On the basis of positive PHQ-9 screening (PHQ-9 Total Score: 18), the following plan was implemented: additional evaluation and assessment performed, follow-up plan includes but not limited to: Medication management and Referral to /Specialist for evaluation and management. Patient will follow-up in 4 week(s) with PCP. Review of Systems   Constitutional:  Positive for fatigue. Negative for chills, fever and unexpected weight change. HENT:  Negative for congestion, rhinorrhea and sore throat. Eyes:  Negative for discharge. Respiratory:  Negative for cough, shortness of breath and wheezing. Cardiovascular:  Negative for chest pain, palpitations and leg swelling. Gastrointestinal:  Negative for abdominal pain, diarrhea, nausea and vomiting. Genitourinary:  Negative for difficulty urinating. Musculoskeletal:  Negative for arthralgias and joint swelling.    Skin:

## 2023-04-24 ENCOUNTER — OFFICE VISIT (OUTPATIENT)
Dept: FAMILY MEDICINE CLINIC | Age: 43
End: 2023-04-24
Payer: COMMERCIAL

## 2023-04-24 VITALS
SYSTOLIC BLOOD PRESSURE: 110 MMHG | TEMPERATURE: 98 F | HEART RATE: 61 BPM | WEIGHT: 224.8 LBS | HEIGHT: 68 IN | BODY MASS INDEX: 34.07 KG/M2 | OXYGEN SATURATION: 96 % | DIASTOLIC BLOOD PRESSURE: 74 MMHG

## 2023-04-24 DIAGNOSIS — G47.33 OSA (OBSTRUCTIVE SLEEP APNEA): ICD-10-CM

## 2023-04-24 DIAGNOSIS — F41.9 ANXIETY AND DEPRESSION: Primary | ICD-10-CM

## 2023-04-24 DIAGNOSIS — F32.A ANXIETY AND DEPRESSION: Primary | ICD-10-CM

## 2023-04-24 PROBLEM — R06.02 SHORTNESS OF BREATH: Status: RESOLVED | Noted: 2022-05-13 | Resolved: 2023-04-24

## 2023-04-24 PROCEDURE — 99214 OFFICE O/P EST MOD 30 MIN: CPT | Performed by: STUDENT IN AN ORGANIZED HEALTH CARE EDUCATION/TRAINING PROGRAM

## 2023-04-24 RX ORDER — HYDROXYZINE PAMOATE 25 MG/1
25 CAPSULE ORAL 3 TIMES DAILY PRN
Qty: 90 CAPSULE | Refills: 1 | Status: SHIPPED | OUTPATIENT
Start: 2023-04-24 | End: 2023-05-24

## 2023-04-24 ASSESSMENT — ENCOUNTER SYMPTOMS
SHORTNESS OF BREATH: 0
VOMITING: 0
ABDOMINAL PAIN: 0
EYE DISCHARGE: 0
DIARRHEA: 0
WHEEZING: 0
COUGH: 0
SORE THROAT: 0
RHINORRHEA: 0
NAUSEA: 0

## 2023-04-24 NOTE — PROGRESS NOTES
Negative for confusion and suicidal ideas. The patient is not nervous/anxious. Past Medical History:   Diagnosis Date    Bell palsy      History reviewed. No pertinent surgical history. Current Outpatient Medications   Medication Sig Dispense Refill    sertraline (ZOLOFT) 50 MG tablet Take 1 tablet by mouth daily 90 tablet 1    hydrOXYzine pamoate (VISTARIL) 25 MG capsule Take 1 capsule by mouth 3 times daily as needed for Anxiety 90 capsule 1    CPAP Machine MISC by Does not apply route New cpap 5-15 CM     PLEASE GIVE ASAP, AHI 28.9 WITH SYMPTOMS 1 each 0    Multiple Vitamins-Minerals (MULTI ADULT GUMMIES PO) Take by mouth      albuterol sulfate HFA (VENTOLIN HFA) 108 (90 Base) MCG/ACT inhaler Inhale 2 puffs into the lungs 4 times daily as needed for Wheezing or Shortness of Breath With spacer and instruct on use 18 g 1    Cholecalciferol (VITAMIN D3) 50 MCG (2000 UT) CAPS Take by mouth      Ascorbic Acid (VITAMIN C) 250 MG tablet Take 1 tablet by mouth daily       No current facility-administered medications for this visit. Allergies, PMH, Surgical Hx, Family Hx, and Social Hx reviewed and updated. Health Maintenance reviewed. Objective    Vitals:    04/24/23 1256   BP: 110/74   Pulse: 61   Temp: 98 °F (36.7 °C)   SpO2: 96%   Weight: 224 lb 12.8 oz (102 kg)   Height: 5' 7.5\" (1.715 m)       Physical Exam  Vitals reviewed. Constitutional:       General: He is not in acute distress. HENT:      Head: Normocephalic and atraumatic. Eyes:      Conjunctiva/sclera: Conjunctivae normal.   Cardiovascular:      Rate and Rhythm: Normal rate and regular rhythm. Heart sounds: No murmur heard. No friction rub. No gallop. Pulmonary:      Effort: Pulmonary effort is normal.      Breath sounds: Normal breath sounds. No wheezing, rhonchi or rales. Musculoskeletal:         General: No swelling or deformity. Cervical back: Normal range of motion and neck supple. Right lower leg: No edema.

## 2023-04-25 ENCOUNTER — OFFICE VISIT (OUTPATIENT)
Dept: PULMONOLOGY | Age: 43
End: 2023-04-25
Payer: COMMERCIAL

## 2023-04-25 VITALS
OXYGEN SATURATION: 97 % | BODY MASS INDEX: 33.95 KG/M2 | TEMPERATURE: 97.7 F | DIASTOLIC BLOOD PRESSURE: 68 MMHG | WEIGHT: 220 LBS | HEART RATE: 62 BPM | SYSTOLIC BLOOD PRESSURE: 118 MMHG

## 2023-04-25 DIAGNOSIS — E66.9 OBESITY (BMI 30-39.9): ICD-10-CM

## 2023-04-25 DIAGNOSIS — G47.33 OSA (OBSTRUCTIVE SLEEP APNEA): Primary | ICD-10-CM

## 2023-04-25 PROCEDURE — 99214 OFFICE O/P EST MOD 30 MIN: CPT | Performed by: INTERNAL MEDICINE

## 2023-04-25 ASSESSMENT — ENCOUNTER SYMPTOMS
RHINORRHEA: 0
COUGH: 0
CHEST TIGHTNESS: 0
DIARRHEA: 0
SHORTNESS OF BREATH: 0
EYE ITCHING: 0
VOMITING: 0
ABDOMINAL PAIN: 0
WHEEZING: 0
NAUSEA: 0
SORE THROAT: 0
VOICE CHANGE: 0

## 2023-06-26 ENCOUNTER — OFFICE VISIT (OUTPATIENT)
Dept: FAMILY MEDICINE CLINIC | Age: 43
End: 2023-06-26
Payer: COMMERCIAL

## 2023-06-26 VITALS
BODY MASS INDEX: 32.71 KG/M2 | WEIGHT: 215.8 LBS | HEIGHT: 68 IN | SYSTOLIC BLOOD PRESSURE: 108 MMHG | DIASTOLIC BLOOD PRESSURE: 80 MMHG | OXYGEN SATURATION: 97 % | TEMPERATURE: 97.8 F | HEART RATE: 69 BPM

## 2023-06-26 DIAGNOSIS — G93.31 POSTVIRAL FATIGUE SYNDROME: ICD-10-CM

## 2023-06-26 DIAGNOSIS — G47.33 OSA (OBSTRUCTIVE SLEEP APNEA): ICD-10-CM

## 2023-06-26 DIAGNOSIS — F41.9 ANXIETY: Primary | ICD-10-CM

## 2023-06-26 DIAGNOSIS — F32.4 MAJOR DEPRESSIVE DISORDER IN PARTIAL REMISSION, UNSPECIFIED WHETHER RECURRENT (HCC): ICD-10-CM

## 2023-06-26 PROCEDURE — 99214 OFFICE O/P EST MOD 30 MIN: CPT | Performed by: STUDENT IN AN ORGANIZED HEALTH CARE EDUCATION/TRAINING PROGRAM

## 2023-06-26 RX ORDER — HYDROXYZINE PAMOATE 25 MG/1
25 CAPSULE ORAL 3 TIMES DAILY PRN
Qty: 270 CAPSULE | Refills: 1 | Status: SHIPPED | OUTPATIENT
Start: 2023-06-26 | End: 2023-09-24

## 2023-06-26 ASSESSMENT — ENCOUNTER SYMPTOMS
NAUSEA: 0
EYE DISCHARGE: 0
COUGH: 0
VOMITING: 0
SORE THROAT: 0
DIARRHEA: 0
RHINORRHEA: 0
WHEEZING: 0
ABDOMINAL PAIN: 0
SHORTNESS OF BREATH: 0

## 2023-07-25 ENCOUNTER — OFFICE VISIT (OUTPATIENT)
Dept: PULMONOLOGY | Age: 43
End: 2023-07-25
Payer: COMMERCIAL

## 2023-07-25 VITALS
BODY MASS INDEX: 33.64 KG/M2 | WEIGHT: 218 LBS | DIASTOLIC BLOOD PRESSURE: 72 MMHG | HEART RATE: 66 BPM | TEMPERATURE: 97.8 F | SYSTOLIC BLOOD PRESSURE: 108 MMHG | OXYGEN SATURATION: 95 %

## 2023-07-25 DIAGNOSIS — E66.9 OBESITY (BMI 30-39.9): ICD-10-CM

## 2023-07-25 DIAGNOSIS — G47.33 OSA (OBSTRUCTIVE SLEEP APNEA): Primary | ICD-10-CM

## 2023-07-25 PROCEDURE — 99214 OFFICE O/P EST MOD 30 MIN: CPT | Performed by: INTERNAL MEDICINE

## 2023-07-25 ASSESSMENT — ENCOUNTER SYMPTOMS
EYE ITCHING: 0
WHEEZING: 0
COUGH: 0
DIARRHEA: 0
RHINORRHEA: 0
VOMITING: 0
SORE THROAT: 0
VOICE CHANGE: 0
SHORTNESS OF BREATH: 0
ABDOMINAL PAIN: 0
CHEST TIGHTNESS: 0
NAUSEA: 0

## 2023-07-25 NOTE — PROGRESS NOTES
Subjective:     Juan M Garsia is a 39 y.o. male who complains today of:     Chief Complaint   Patient presents with    Follow-up     3m f/u on TSEFFEN       HPI  He is using CPAP with  5-15  centimeters of H2O with heated humidity. He is using CPAP for about  7-8   hours every night. He is using CPAP with  nasal  Mask. He said  sleep is restful with the CPAP use. He is compliant with CPAP therapy and benefiting with CPAP use. No snoring with CPAP use. No complaint of daytime sleepiness or tiredness with CPAP use. He denies taking naps. No sleepiness with driving. He denies difficulty falling asleep or staying asleep. I reviewed compliance report with patient regarding CPAP therapy. He is using  CPAP for 30 days out of 30 days. Average usage of days used is 6 hours and 10 min , average AHI 1.7 with CPAP use. Allergies:  Patient has no known allergies. Past Medical History:   Diagnosis Date    Bell palsy      No past surgical history on file. Family History   Problem Relation Age of Onset    No Known Problems Mother     No Known Problems Father      Social History     Socioeconomic History    Marital status: Single     Spouse name: Not on file    Number of children: Not on file    Years of education: Not on file    Highest education level: Not on file   Occupational History    Not on file   Tobacco Use    Smoking status: Former     Packs/day: 0.25     Years: 2.00     Pack years: 0.50     Types: Cigarettes     Quit date: 2007     Years since quittin.5     Passive exposure: Past    Smokeless tobacco: Never   Vaping Use    Vaping Use: Never used   Substance and Sexual Activity    Alcohol use:  Yes     Alcohol/week: 5.0 standard drinks     Types: 6 drink(s) per week    Drug use: Not Currently     Types: Marijuana [employer])    Sexual activity: Not on file   Other Topics Concern    Not on file   Social History Narrative    Not on file     Social Determinants of Health     Financial Resource

## 2023-09-11 ENCOUNTER — TELEPHONE (OUTPATIENT)
Dept: PULMONOLOGY | Age: 43
End: 2023-09-11

## 2023-11-27 ENCOUNTER — OFFICE VISIT (OUTPATIENT)
Dept: PULMONOLOGY | Age: 43
End: 2023-11-27
Payer: COMMERCIAL

## 2023-11-27 VITALS
OXYGEN SATURATION: 98 % | HEART RATE: 78 BPM | DIASTOLIC BLOOD PRESSURE: 78 MMHG | WEIGHT: 218.03 LBS | HEIGHT: 68 IN | TEMPERATURE: 97.6 F | BODY MASS INDEX: 33.04 KG/M2 | SYSTOLIC BLOOD PRESSURE: 108 MMHG

## 2023-11-27 DIAGNOSIS — E66.9 OBESITY (BMI 30-39.9): ICD-10-CM

## 2023-11-27 DIAGNOSIS — G47.33 OSA (OBSTRUCTIVE SLEEP APNEA): Primary | ICD-10-CM

## 2023-11-27 PROCEDURE — 99214 OFFICE O/P EST MOD 30 MIN: CPT | Performed by: INTERNAL MEDICINE

## 2023-11-27 ASSESSMENT — ENCOUNTER SYMPTOMS
VOMITING: 0
ABDOMINAL PAIN: 0
WHEEZING: 0
EYE ITCHING: 0
NAUSEA: 0
VOICE CHANGE: 0
RHINORRHEA: 0
SORE THROAT: 0
CHEST TIGHTNESS: 0
SHORTNESS OF BREATH: 0
DIARRHEA: 0
COUGH: 0

## 2023-12-06 ENCOUNTER — TELEPHONE (OUTPATIENT)
Dept: PULMONOLOGY | Age: 43
End: 2023-12-06

## 2023-12-06 NOTE — TELEPHONE ENCOUNTER
CARELON CALLED IN TO ADVISE THE PTS CPAP MACHINE WAS APPROVED FROM 12/5/23-3/3/24      APPROVAL # [id number]        KID

## 2024-01-15 ENCOUNTER — OFFICE VISIT (OUTPATIENT)
Dept: FAMILY MEDICINE CLINIC | Age: 44
End: 2024-01-15
Payer: COMMERCIAL

## 2024-01-15 VITALS
DIASTOLIC BLOOD PRESSURE: 80 MMHG | HEIGHT: 63 IN | BODY MASS INDEX: 39.69 KG/M2 | WEIGHT: 224 LBS | OXYGEN SATURATION: 96 % | HEART RATE: 70 BPM | TEMPERATURE: 98.1 F | SYSTOLIC BLOOD PRESSURE: 100 MMHG

## 2024-01-15 DIAGNOSIS — F41.9 ANXIETY: Primary | ICD-10-CM

## 2024-01-15 DIAGNOSIS — E78.00 ELEVATED CHOLESTEROL: ICD-10-CM

## 2024-01-15 DIAGNOSIS — F32.4 MAJOR DEPRESSIVE DISORDER IN PARTIAL REMISSION, UNSPECIFIED WHETHER RECURRENT (HCC): ICD-10-CM

## 2024-01-15 DIAGNOSIS — R73.03 PREDIABETES: ICD-10-CM

## 2024-01-15 DIAGNOSIS — E66.01 SEVERE OBESITY (BMI 35.0-39.9) WITH COMORBIDITY (HCC): ICD-10-CM

## 2024-01-15 LAB — HBA1C MFR BLD: 6 %

## 2024-01-15 PROCEDURE — 83036 HEMOGLOBIN GLYCOSYLATED A1C: CPT | Performed by: STUDENT IN AN ORGANIZED HEALTH CARE EDUCATION/TRAINING PROGRAM

## 2024-01-15 PROCEDURE — 99214 OFFICE O/P EST MOD 30 MIN: CPT | Performed by: STUDENT IN AN ORGANIZED HEALTH CARE EDUCATION/TRAINING PROGRAM

## 2024-01-15 RX ORDER — HYDROXYZINE PAMOATE 25 MG/1
25 CAPSULE ORAL 3 TIMES DAILY PRN
COMMUNITY
End: 2024-01-15 | Stop reason: SDUPTHER

## 2024-01-15 RX ORDER — HYDROXYZINE PAMOATE 25 MG/1
25 CAPSULE ORAL 3 TIMES DAILY PRN
Qty: 270 CAPSULE | Refills: 1 | Status: SHIPPED | OUTPATIENT
Start: 2024-01-15

## 2024-01-15 ASSESSMENT — PATIENT HEALTH QUESTIONNAIRE - PHQ9
SUM OF ALL RESPONSES TO PHQ QUESTIONS 1-9: 1
5. POOR APPETITE OR OVEREATING: 0
8. MOVING OR SPEAKING SO SLOWLY THAT OTHER PEOPLE COULD HAVE NOTICED. OR THE OPPOSITE, BEING SO FIGETY OR RESTLESS THAT YOU HAVE BEEN MOVING AROUND A LOT MORE THAN USUAL: 0
10. IF YOU CHECKED OFF ANY PROBLEMS, HOW DIFFICULT HAVE THESE PROBLEMS MADE IT FOR YOU TO DO YOUR WORK, TAKE CARE OF THINGS AT HOME, OR GET ALONG WITH OTHER PEOPLE: 0
SUM OF ALL RESPONSES TO PHQ9 QUESTIONS 1 & 2: 1
4. FEELING TIRED OR HAVING LITTLE ENERGY: 0
2. FEELING DOWN, DEPRESSED OR HOPELESS: 1
1. LITTLE INTEREST OR PLEASURE IN DOING THINGS: 0
SUM OF ALL RESPONSES TO PHQ QUESTIONS 1-9: 1
7. TROUBLE CONCENTRATING ON THINGS, SUCH AS READING THE NEWSPAPER OR WATCHING TELEVISION: 0
9. THOUGHTS THAT YOU WOULD BE BETTER OFF DEAD, OR OF HURTING YOURSELF: 0
SUM OF ALL RESPONSES TO PHQ QUESTIONS 1-9: 1
6. FEELING BAD ABOUT YOURSELF - OR THAT YOU ARE A FAILURE OR HAVE LET YOURSELF OR YOUR FAMILY DOWN: 0
3. TROUBLE FALLING OR STAYING ASLEEP: 0
SUM OF ALL RESPONSES TO PHQ QUESTIONS 1-9: 1

## 2024-01-15 NOTE — PROGRESS NOTES
Subjective  Jerman Dickens, 43 y.o. male presents today with:  Chief Complaint   Patient presents with    6 Month Follow-Up    Pre-Diabetes     A1c was 6.0 on 3/27/23. Does not check blood sugar at home.     Depression     Feels mood is better than it was a year ago. Feels his mood is pretty stable. Does have some days of feeling down a little.     Anxiety     Feels this has improved, but does still struggle a little. Is taking hydroxyzine 3 times a day now. Was only taking it once, but started to feel he needed it more. Feels it does help some.     Fatigue     No longer feeling fatigued, like he was in the past.     Sleep Apnea     Is wearing CPAP nightly. Feels this is beneficial.        Patient with 6-month follow-up appointment.    Patient with anxiety.  He feels that this has improved.  He is currently on sertraline 50 mg daily.  Tolerating well.  No side effects to medication.  He also feels that depression has improved as well.  He feels that this is much better.  He feels that his mood is stable.  He denies feeling down or depressed.  He is taking hydroxyzine a couple times a day to help with anxiety.  Does not make him sleepy.  Does help him function better throughout the day.  Not currently interested in counseling.    Patient also with sleep apnea.  He does follow with pulmonology.  He feels that this is very beneficial.  His fatigue has improved.  He is overall doing better.    Patient also with prediabetes.  Not currently on medication.  Due for A1c.  He also has history of elevated cholesterol.  Will plan to recheck labs.  No other concerns at this time.    Review of Systems   Constitutional:  Negative for chills, fatigue, fever and unexpected weight change.   HENT:  Negative for congestion, rhinorrhea and sore throat.    Eyes:  Negative for discharge.   Respiratory:  Negative for cough, shortness of breath and wheezing.    Cardiovascular:  Negative for chest pain, palpitations and leg swelling.

## 2024-01-16 ASSESSMENT — ENCOUNTER SYMPTOMS
ABDOMINAL PAIN: 0
VOMITING: 0
COUGH: 0
SORE THROAT: 0
DIARRHEA: 0
WHEEZING: 0
EYE DISCHARGE: 0
NAUSEA: 0
SHORTNESS OF BREATH: 0
RHINORRHEA: 0

## 2024-02-28 ENCOUNTER — TELEPHONE (OUTPATIENT)
Dept: PULMONOLOGY | Age: 44
End: 2024-02-28

## 2024-03-11 ENCOUNTER — OFFICE VISIT (OUTPATIENT)
Dept: PULMONOLOGY | Age: 44
End: 2024-03-11
Payer: COMMERCIAL

## 2024-03-11 VITALS
WEIGHT: 221 LBS | HEART RATE: 60 BPM | SYSTOLIC BLOOD PRESSURE: 110 MMHG | BODY MASS INDEX: 39.15 KG/M2 | OXYGEN SATURATION: 98 % | DIASTOLIC BLOOD PRESSURE: 80 MMHG

## 2024-03-11 DIAGNOSIS — E66.9 OBESITY (BMI 30-39.9): ICD-10-CM

## 2024-03-11 DIAGNOSIS — G47.33 OSA (OBSTRUCTIVE SLEEP APNEA): Primary | ICD-10-CM

## 2024-03-11 PROCEDURE — 99214 OFFICE O/P EST MOD 30 MIN: CPT | Performed by: INTERNAL MEDICINE

## 2024-03-11 ASSESSMENT — ENCOUNTER SYMPTOMS
VOICE CHANGE: 0
DIARRHEA: 0
RHINORRHEA: 0
COUGH: 0
VOMITING: 0
ABDOMINAL PAIN: 0
SHORTNESS OF BREATH: 0
SORE THROAT: 0
WHEEZING: 0
EYE ITCHING: 0
CHEST TIGHTNESS: 0
NAUSEA: 0

## 2024-03-11 NOTE — PROGRESS NOTES
caution when driving or operating machinery if there is a feeling of drowsiness, especially while driving it is preferable to stop driving and take a brief nap.  Sleep hygiene:Avoid supine sleep, sleep on  sides. Avoid  sleep deprivation.  Explained sleep hygiene.  Advice to avoid Alcohol and sedative    Time spend over 30 min. Face to face.with greater than 50 % time with CPAP therapy including review compliance, counseling and advised regarding CPAP therapy.     2. Obesity (BMI 30-39.9)  He is advised try to lose weight. obesity related risk explained to the patient ,  Current weight:  100.2 kg (221 lb) Lbs. BMI:  Body mass index is 39.15 kg/m².  Suggested weight control approaches, including dietary changes , exercise, behavioral modification.      Return in about 4 months (around 7/11/2024) for fito.      José Luis Carrasco MD

## 2024-07-08 DIAGNOSIS — E78.00 ELEVATED CHOLESTEROL: ICD-10-CM

## 2024-07-08 DIAGNOSIS — R73.03 PREDIABETES: ICD-10-CM

## 2024-07-08 LAB
ALBUMIN SERPL-MCNC: 4.7 G/DL (ref 3.5–4.6)
ALP SERPL-CCNC: 71 U/L (ref 35–104)
ALT SERPL-CCNC: 18 U/L (ref 0–41)
ANION GAP SERPL CALCULATED.3IONS-SCNC: 14 MEQ/L (ref 9–15)
AST SERPL-CCNC: 19 U/L (ref 0–40)
BILIRUB SERPL-MCNC: 0.4 MG/DL (ref 0.2–0.7)
BUN SERPL-MCNC: 20 MG/DL (ref 6–20)
CALCIUM SERPL-MCNC: 9.4 MG/DL (ref 8.5–9.9)
CHLORIDE SERPL-SCNC: 100 MEQ/L (ref 95–107)
CHOLEST SERPL-MCNC: 278 MG/DL (ref 0–199)
CO2 SERPL-SCNC: 25 MEQ/L (ref 20–31)
CREAT SERPL-MCNC: 0.88 MG/DL (ref 0.7–1.2)
GLOBULIN SER CALC-MCNC: 2.8 G/DL (ref 2.3–3.5)
GLUCOSE FASTING: 119 MG/DL (ref 70–99)
HDLC SERPL-MCNC: 50 MG/DL (ref 40–59)
LDL CHOLESTEROL: 190 MG/DL (ref 0–129)
POTASSIUM SERPL-SCNC: 4.4 MEQ/L (ref 3.4–4.9)
PROT SERPL-MCNC: 7.5 G/DL (ref 6.3–8)
SODIUM SERPL-SCNC: 139 MEQ/L (ref 135–144)
TRIGLYCERIDE, FASTING: 189 MG/DL (ref 0–150)

## 2024-07-11 ENCOUNTER — OFFICE VISIT (OUTPATIENT)
Dept: PULMONOLOGY | Age: 44
End: 2024-07-11
Payer: COMMERCIAL

## 2024-07-11 VITALS
DIASTOLIC BLOOD PRESSURE: 82 MMHG | BODY MASS INDEX: 40.03 KG/M2 | HEART RATE: 76 BPM | TEMPERATURE: 97.7 F | OXYGEN SATURATION: 97 % | WEIGHT: 226 LBS | SYSTOLIC BLOOD PRESSURE: 112 MMHG

## 2024-07-11 DIAGNOSIS — G47.33 OSA (OBSTRUCTIVE SLEEP APNEA): Primary | ICD-10-CM

## 2024-07-11 DIAGNOSIS — E66.01 MORBID OBESITY (HCC): ICD-10-CM

## 2024-07-11 PROCEDURE — 99214 OFFICE O/P EST MOD 30 MIN: CPT | Performed by: INTERNAL MEDICINE

## 2024-07-11 ASSESSMENT — ENCOUNTER SYMPTOMS
VOICE CHANGE: 0
WHEEZING: 0
EYE ITCHING: 0
RHINORRHEA: 0
ABDOMINAL PAIN: 0
DIARRHEA: 0
SHORTNESS OF BREATH: 0
NAUSEA: 0
SORE THROAT: 0
CHEST TIGHTNESS: 0
COUGH: 0
VOMITING: 0

## 2024-07-11 NOTE — PROGRESS NOTES
Subjective:             Jerman Dickens is a 44 y.o. male who complains today of:     Chief Complaint   Patient presents with    Follow-up     4m f/u on STEFFEN       HPI  He is using CPAP with  5-15  centimeters of H2O with heated humidity for about  7-8   hours every night.He is using CPAP with  nasal  Mask. No need for CPAP supply.  He said  sleep is restful with the CPAP use.  He is compliant with CPAP therapy and benefiting with CPAP use.  No snoring with CPAP use.  No complaint of daytime sleepiness or tiredness with CPAP use.  He denies taking naps.  No sleepiness with driving.   He denies difficulty falling asleep or staying asleep.     I reviewed compliance report with patient regarding CPAP therapy. He is using  CPAP for 30 days out of 30 days.  Average usage of days used is 7 hours and 41 min , average AHI 2.0 with CPAP use.    Allergies:  Patient has no known allergies.  Past Medical History:   Diagnosis Date    Bell palsy      No past surgical history on file.  Family History   Problem Relation Age of Onset    No Known Problems Mother     No Known Problems Father      Social History     Socioeconomic History    Marital status: Single     Spouse name: Not on file    Number of children: Not on file    Years of education: Not on file    Highest education level: Not on file   Occupational History    Not on file   Tobacco Use    Smoking status: Former     Current packs/day: 0.00     Average packs/day: 0.3 packs/day for 2.0 years (0.5 ttl pk-yrs)     Types: Cigarettes     Start date: 2005     Quit date: 2007     Years since quittin.5     Passive exposure: Past    Smokeless tobacco: Never   Vaping Use    Vaping Use: Never used   Substance and Sexual Activity    Alcohol use: Yes     Alcohol/week: 5.0 standard drinks of alcohol     Types: 6 drink(s) per week    Drug use: Not Currently     Types: Marijuana (Weed)    Sexual activity: Not on file   Other Topics Concern    Not on file   Social History

## 2024-07-15 ENCOUNTER — OFFICE VISIT (OUTPATIENT)
Dept: FAMILY MEDICINE CLINIC | Age: 44
End: 2024-07-15
Payer: COMMERCIAL

## 2024-07-15 VITALS
BODY MASS INDEX: 40.04 KG/M2 | OXYGEN SATURATION: 98 % | HEIGHT: 63 IN | DIASTOLIC BLOOD PRESSURE: 76 MMHG | SYSTOLIC BLOOD PRESSURE: 136 MMHG | WEIGHT: 226 LBS | HEART RATE: 78 BPM | TEMPERATURE: 97.9 F

## 2024-07-15 DIAGNOSIS — F32.4 MAJOR DEPRESSIVE DISORDER IN PARTIAL REMISSION, UNSPECIFIED WHETHER RECURRENT (HCC): ICD-10-CM

## 2024-07-15 DIAGNOSIS — E78.2 MIXED HYPERLIPIDEMIA: ICD-10-CM

## 2024-07-15 DIAGNOSIS — F41.9 ANXIETY: Primary | ICD-10-CM

## 2024-07-15 DIAGNOSIS — D17.1 LIPOMA OF ABDOMINAL WALL: ICD-10-CM

## 2024-07-15 PROCEDURE — 99214 OFFICE O/P EST MOD 30 MIN: CPT | Performed by: STUDENT IN AN ORGANIZED HEALTH CARE EDUCATION/TRAINING PROGRAM

## 2024-07-15 RX ORDER — ROSUVASTATIN CALCIUM 20 MG/1
20 TABLET, COATED ORAL DAILY
Qty: 90 TABLET | Refills: 1 | Status: SHIPPED | OUTPATIENT
Start: 2024-07-15

## 2024-07-15 SDOH — ECONOMIC STABILITY: FOOD INSECURITY: WITHIN THE PAST 12 MONTHS, YOU WORRIED THAT YOUR FOOD WOULD RUN OUT BEFORE YOU GOT MONEY TO BUY MORE.: NEVER TRUE

## 2024-07-15 SDOH — ECONOMIC STABILITY: FOOD INSECURITY: WITHIN THE PAST 12 MONTHS, THE FOOD YOU BOUGHT JUST DIDN'T LAST AND YOU DIDN'T HAVE MONEY TO GET MORE.: NEVER TRUE

## 2024-07-15 SDOH — ECONOMIC STABILITY: INCOME INSECURITY: HOW HARD IS IT FOR YOU TO PAY FOR THE VERY BASICS LIKE FOOD, HOUSING, MEDICAL CARE, AND HEATING?: NOT HARD AT ALL

## 2024-07-15 ASSESSMENT — ENCOUNTER SYMPTOMS
RHINORRHEA: 0
WHEEZING: 0
NAUSEA: 0
SHORTNESS OF BREATH: 0
ABDOMINAL DISTENTION: 1
EYE DISCHARGE: 0
SORE THROAT: 0
DIARRHEA: 0
VOMITING: 0
COUGH: 0
ABDOMINAL PAIN: 0

## 2024-07-15 NOTE — PROGRESS NOTES
Follow-up as scheduled.  - sertraline (ZOLOFT) 50 MG tablet; Take 1 tablet by mouth daily  Dispense: 90 tablet; Refill: 1    3. Lipoma of abdominal wall  Patient with possible lipoma of abdominal wall that is tender and bothersome.  CT of the abdomen and pelvis ordered.  Call with results when return.  Continue to monitor.  Follow-up as scheduled.  If symptoms worsen or change, return to care sooner.  - CT ABDOMEN PELVIS W IV CONTRAST Additional Contrast? None; Future    4. Mixed hyperlipidemia  Not currently controlled.  Discussed options.  Discussed healthy diet and exercise.  Will also plan to add rosuvastatin 20 mg daily.  Refill sent.  Continue to monitor.  Follow-up as scheduled.  - rosuvastatin (CRESTOR) 20 MG tablet; Take 1 tablet by mouth daily  Dispense: 90 tablet; Refill: 1    Orders Placed This Encounter   Procedures    CT ABDOMEN PELVIS W IV CONTRAST Additional Contrast? None     Standing Status:   Future     Standing Expiration Date:   7/15/2025     Order Specific Question:   Additional Contrast?     Answer:   None     Order Specific Question:   STAT Creatinine as needed:     Answer:   No     Orders Placed This Encounter   Medications    rosuvastatin (CRESTOR) 20 MG tablet     Sig: Take 1 tablet by mouth daily     Dispense:  90 tablet     Refill:  1    sertraline (ZOLOFT) 50 MG tablet     Sig: Take 1 tablet by mouth daily     Dispense:  90 tablet     Refill:  1     Medications Discontinued During This Encounter   Medication Reason    sertraline (ZOLOFT) 50 MG tablet REORDER     Return in about 2 months (around 9/15/2024) for follow up.      Reviewed with the patient: current clinical status,medications, activities and diet.     Side effects, adverse effects of the medication prescribed today, as well as treatment plan/ rationale and result expectations have been discussed with the patient who expresses understanding and desires to proceed.    Close follow up to evaluate treatment results and for

## 2024-07-22 ENCOUNTER — HOSPITAL ENCOUNTER (OUTPATIENT)
Dept: CT IMAGING | Age: 44
Discharge: HOME | End: 2024-07-24
Payer: COMMERCIAL

## 2024-07-22 DIAGNOSIS — D17.1 LIPOMA OF ABDOMINAL WALL: ICD-10-CM

## 2024-07-22 PROCEDURE — 6360000004 HC RX CONTRAST MEDICATION: Performed by: STUDENT IN AN ORGANIZED HEALTH CARE EDUCATION/TRAINING PROGRAM

## 2024-07-22 PROCEDURE — 74177 CT ABD & PELVIS W/CONTRAST: CPT

## 2024-07-22 RX ADMIN — IOPAMIDOL 75 ML: 755 INJECTION, SOLUTION INTRAVENOUS at 15:56

## 2024-07-22 RX ADMIN — IOPAMIDOL 18 ML: 755 INJECTION, SOLUTION INTRAVENOUS at 15:57

## 2024-08-02 ENCOUNTER — OFFICE VISIT (OUTPATIENT)
Dept: FAMILY MEDICINE CLINIC | Age: 44
End: 2024-08-02
Payer: COMMERCIAL

## 2024-08-02 VITALS
OXYGEN SATURATION: 96 % | SYSTOLIC BLOOD PRESSURE: 118 MMHG | HEIGHT: 63 IN | WEIGHT: 221.4 LBS | DIASTOLIC BLOOD PRESSURE: 88 MMHG | BODY MASS INDEX: 39.23 KG/M2 | HEART RATE: 97 BPM | TEMPERATURE: 97.8 F

## 2024-08-02 DIAGNOSIS — S39.012A STRAIN OF LUMBAR PARASPINOUS MUSCLE, INITIAL ENCOUNTER: Primary | ICD-10-CM

## 2024-08-02 PROCEDURE — 96372 THER/PROPH/DIAG INJ SC/IM: CPT | Performed by: NURSE PRACTITIONER

## 2024-08-02 PROCEDURE — 99213 OFFICE O/P EST LOW 20 MIN: CPT | Performed by: NURSE PRACTITIONER

## 2024-08-02 RX ORDER — METHYLPREDNISOLONE 4 MG/1
TABLET ORAL
Qty: 1 KIT | Refills: 0 | Status: SHIPPED | OUTPATIENT
Start: 2024-08-02

## 2024-08-02 RX ORDER — CYCLOBENZAPRINE HCL 5 MG
5 TABLET ORAL 3 TIMES DAILY PRN
Qty: 30 TABLET | Refills: 0 | Status: SHIPPED | OUTPATIENT
Start: 2024-08-02 | End: 2024-08-12

## 2024-08-02 RX ORDER — KETOROLAC TROMETHAMINE 30 MG/ML
60 INJECTION, SOLUTION INTRAMUSCULAR; INTRAVENOUS ONCE
Status: COMPLETED | OUTPATIENT
Start: 2024-08-02 | End: 2024-08-02

## 2024-08-02 RX ADMIN — KETOROLAC TROMETHAMINE 180 MG: 30 INJECTION, SOLUTION INTRAMUSCULAR; INTRAVENOUS at 13:45

## 2024-08-02 ASSESSMENT — ENCOUNTER SYMPTOMS
VOMITING: 0
COLOR CHANGE: 0
BACK PAIN: 1
ABDOMINAL PAIN: 0
CHEST TIGHTNESS: 0
NAUSEA: 0
CONSTIPATION: 0
DIARRHEA: 0
SHORTNESS OF BREATH: 0

## 2024-08-02 NOTE — PROGRESS NOTES
.After obtaining consent, and per orders of Yevgeniy Ly,DIANA injection of Toradol given in LT gleut by Enma Tom MA. Patient instructed to remain in clinic for 20 minutes afterwards, and to report any adverse reaction to me immediately. Tolerated well    
the Last Year: Never true   Transportation Needs: Unknown (7/15/2024)    PRAPARE - Transportation     Lack of Transportation (Non-Medical): No   Housing Stability: Unknown (7/15/2024)    Housing Stability Vital Sign     Unstable Housing in the Last Year: No        PHYSICAL EXAM     Vitals:    08/02/24 1301   BP: 118/88   Site: Right Upper Arm   Position: Sitting   Cuff Size: Medium Adult   Pulse: 97   Temp: 97.8 °F (36.6 °C)   TempSrc: Oral   SpO2: 96%   Weight: 100.4 kg (221 lb 6.4 oz)   Height: 1.6 m (5' 3\")     Physical Exam  Vitals and nursing note reviewed.   Constitutional:       General: He is not in acute distress.     Appearance: Normal appearance.   Cardiovascular:      Rate and Rhythm: Normal rate and regular rhythm.   Pulmonary:      Effort: Pulmonary effort is normal.      Breath sounds: Normal breath sounds.   Musculoskeletal:      Cervical back: Normal range of motion and neck supple. No rigidity.      Thoracic back: Normal.      Lumbar back: Tenderness (Over the left lumbar paraspinal region, with tightness and spasm over noted the spinous erector) present. No swelling, deformity, spasms or bony tenderness. Decreased range of motion (Pain with extension of lower back). Negative right straight leg raise test and negative left straight leg raise test.   Skin:     General: Skin is warm and dry.   Neurological:      General: No focal deficit present.      Mental Status: He is alert and oriented to person, place, and time.   Psychiatric:         Mood and Affect: Mood normal.         Behavior: Behavior normal.         ASSESSMENT/PLAN     1. Strain of lumbar paraspinous muscle, initial encounter  One-time IM injection of Toradol today in office will start with Medrol Dosepak tomorrow, can use cyclobenzaprine as needed.  Discussed at home stretches and light range of motion exercises to complete multiple times per day.  Discussed icing and heating as beneficial to the area.  Follow-up with any new or

## 2024-09-16 ENCOUNTER — OFFICE VISIT (OUTPATIENT)
Dept: FAMILY MEDICINE CLINIC | Age: 44
End: 2024-09-16
Payer: COMMERCIAL

## 2024-09-16 VITALS
OXYGEN SATURATION: 96 % | WEIGHT: 227 LBS | HEART RATE: 80 BPM | HEIGHT: 63 IN | SYSTOLIC BLOOD PRESSURE: 112 MMHG | BODY MASS INDEX: 40.22 KG/M2 | DIASTOLIC BLOOD PRESSURE: 72 MMHG | TEMPERATURE: 97.4 F

## 2024-09-16 DIAGNOSIS — D17.1 LIPOMA OF ABDOMINAL WALL: ICD-10-CM

## 2024-09-16 DIAGNOSIS — F41.9 ANXIETY: Primary | ICD-10-CM

## 2024-09-16 DIAGNOSIS — E78.2 MIXED HYPERLIPIDEMIA: ICD-10-CM

## 2024-09-16 DIAGNOSIS — R73.03 PREDIABETES: ICD-10-CM

## 2024-09-16 DIAGNOSIS — F32.4 MAJOR DEPRESSIVE DISORDER IN PARTIAL REMISSION, UNSPECIFIED WHETHER RECURRENT (HCC): ICD-10-CM

## 2024-09-16 LAB — HBA1C MFR BLD: 6 %

## 2024-09-16 PROCEDURE — 83036 HEMOGLOBIN GLYCOSYLATED A1C: CPT | Performed by: STUDENT IN AN ORGANIZED HEALTH CARE EDUCATION/TRAINING PROGRAM

## 2024-09-16 PROCEDURE — 99214 OFFICE O/P EST MOD 30 MIN: CPT | Performed by: STUDENT IN AN ORGANIZED HEALTH CARE EDUCATION/TRAINING PROGRAM

## 2024-09-17 ASSESSMENT — ENCOUNTER SYMPTOMS
EYE DISCHARGE: 0
DIARRHEA: 0
SHORTNESS OF BREATH: 0
ABDOMINAL PAIN: 0
WHEEZING: 0
VOMITING: 0
COUGH: 0
ABDOMINAL DISTENTION: 0
SORE THROAT: 0
RHINORRHEA: 0
NAUSEA: 0

## 2024-09-23 ENCOUNTER — HOSPITAL ENCOUNTER (OUTPATIENT)
Dept: ULTRASOUND IMAGING | Age: 44
Discharge: HOME | End: 2024-09-25
Payer: COMMERCIAL

## 2024-09-23 DIAGNOSIS — D17.1 LIPOMA OF ABDOMINAL WALL: ICD-10-CM

## 2024-09-23 PROCEDURE — 76999 ECHO EXAMINATION PROCEDURE: CPT

## 2024-11-17 DIAGNOSIS — S39.012A STRAIN OF LUMBAR PARASPINOUS MUSCLE, INITIAL ENCOUNTER: ICD-10-CM

## 2024-11-19 RX ORDER — CYCLOBENZAPRINE HCL 5 MG
5 TABLET ORAL 3 TIMES DAILY PRN
Qty: 30 TABLET | Refills: 0 | OUTPATIENT
Start: 2024-11-19

## 2024-11-19 NOTE — TELEPHONE ENCOUNTER
Comments:     Last Office Visit (last PCP visit):   8/2/2024    Next Visit Date:  Future Appointments   Date Time Provider Department Center   11/26/2024 11:30 AM José Luis Carrasco MD [facility]   12/16/2024  9:00 AM Paradise Márquez DO [facility]       **If hasn't been seen in over a year OR hasn't followed up according to last diabetes/ADHD visit, make appointment for patient before sending refill to provider.    Rx requested:  Requested Prescriptions     Pending Prescriptions Disp Refills    cyclobenzaprine (FLEXERIL) 5 MG tablet [Pharmacy Med Name: cyclobenzaprine 5 mg tablet] 30 tablet 0     Sig: TAKE 1 TABLET BY MOUTH THREE TIMES DAILY AS NEEDED for muscle spasms

## 2024-11-26 ENCOUNTER — OFFICE VISIT (OUTPATIENT)
Dept: PULMONOLOGY | Age: 44
End: 2024-11-26
Payer: COMMERCIAL

## 2024-11-26 VITALS
BODY MASS INDEX: 40.57 KG/M2 | OXYGEN SATURATION: 96 % | WEIGHT: 229 LBS | HEART RATE: 65 BPM | SYSTOLIC BLOOD PRESSURE: 112 MMHG | DIASTOLIC BLOOD PRESSURE: 72 MMHG

## 2024-11-26 DIAGNOSIS — G47.33 OSA (OBSTRUCTIVE SLEEP APNEA): Primary | ICD-10-CM

## 2024-11-26 DIAGNOSIS — R06.02 SOB (SHORTNESS OF BREATH): ICD-10-CM

## 2024-11-26 DIAGNOSIS — E66.01 MORBID OBESITY: ICD-10-CM

## 2024-11-26 PROCEDURE — 99214 OFFICE O/P EST MOD 30 MIN: CPT | Performed by: INTERNAL MEDICINE

## 2024-11-26 RX ORDER — ALBUTEROL SULFATE 90 UG/1
2 INHALANT RESPIRATORY (INHALATION) 4 TIMES DAILY PRN
Qty: 18 G | Refills: 1 | Status: SHIPPED | OUTPATIENT
Start: 2024-11-26

## 2024-11-26 NOTE — PROGRESS NOTES
ATELECTASIS/PNEUMONIA.  ]  No results found for this or any previous visit.  ]  No results found for this or any previous visit.  ]  Results for orders placed during the hospital encounter of 06/03/22    CT CHEST WO CONTRAST    Narrative  EXAMINATION: CT CHEST WO CONTRAST    DATE:6/3/2022 3:58 PM    CLINICAL HISTORY: Anterior chest pain. Shortness of breath. J98.4 Lung density on x-ray ICD10    COMPARISON:  October 12, 2021    TECHNIQUE: Helical CT was performed through the chest without IV contrast., All CT scans at this facility use dose modulation, iterative reconstruction, and/or weight based dosing when appropriate to reduce radiation dose to as low as reasonably  achievable. Some of this report was completed using Power Scribe 360 voice-recognition technology and may include unintended errors with respect to translation of words, typographical errors or grammatical errors which may not have been identified prior  to the finalization of this report.      FINDINGS:    Lungs: Minimal subpleural groundglass opacities at the right base with clearing of previous bilateral infiltrates seen on the CT study from October 12, 2021. No fresh infiltrate. Some minimal atelectatic changes noted at the left lingula that may  account for chest radiographic findings.    Pleura:Normal. No effusion or thickening    Mediastinum :Mediastinum and [place] are unremarkable.    Vessels:Thoracic aorta is intact.    Bones:No acute osseous abnormalities.    Other:None    Impression  CLEARING OF PREVIOUS BILATERAL INFILTRATES. MINIMAL ATELECTASIS IN THE LEFT LINGULAR AREA.  ]  No results found for this or any previous visit.  ]    Assessment/Plan:     1. STEFFEN (obstructive sleep apnea)  He is using CPAP with  5-15  centimeters of H2O with heated humidity for about  7-8   hours every night.He is using CPAP with  nasal  Mask. No need for CPAP supply.He said  sleep is restful with the CPAP use.He is compliant with CPAP therapy and benefiting with

## 2024-11-29 ENCOUNTER — OFFICE VISIT (OUTPATIENT)
Dept: FAMILY MEDICINE CLINIC | Age: 44
End: 2024-11-29

## 2024-11-29 VITALS
DIASTOLIC BLOOD PRESSURE: 84 MMHG | OXYGEN SATURATION: 98 % | BODY MASS INDEX: 40.75 KG/M2 | HEART RATE: 77 BPM | HEIGHT: 63 IN | SYSTOLIC BLOOD PRESSURE: 122 MMHG | WEIGHT: 230 LBS

## 2024-11-29 DIAGNOSIS — M54.32 SCIATICA, LEFT SIDE: Primary | ICD-10-CM

## 2024-11-29 RX ORDER — CYCLOBENZAPRINE HCL 5 MG
5 TABLET ORAL 3 TIMES DAILY PRN
Qty: 30 TABLET | Refills: 0 | Status: SHIPPED | OUTPATIENT
Start: 2024-11-29 | End: 2024-12-09

## 2024-11-29 RX ORDER — TRIAMCINOLONE ACETONIDE 40 MG/ML
60 INJECTION, SUSPENSION INTRA-ARTICULAR; INTRAMUSCULAR ONCE
Status: COMPLETED | OUTPATIENT
Start: 2024-11-29 | End: 2024-11-29

## 2024-11-29 RX ORDER — TRIAMCINOLONE ACETONIDE 40 MG/ML
40 INJECTION, SUSPENSION INTRA-ARTICULAR; INTRAMUSCULAR ONCE
Status: CANCELLED | OUTPATIENT
Start: 2024-11-29 | End: 2024-11-29

## 2024-11-29 RX ADMIN — TRIAMCINOLONE ACETONIDE 60 MG: 40 INJECTION, SUSPENSION INTRA-ARTICULAR; INTRAMUSCULAR at 08:28

## 2024-11-29 ASSESSMENT — ENCOUNTER SYMPTOMS
WHEEZING: 0
COLOR CHANGE: 0
DIARRHEA: 0
SHORTNESS OF BREATH: 0
VOMITING: 0
BACK PAIN: 1
COUGH: 0
NAUSEA: 0
ABDOMINAL PAIN: 0
CHEST TIGHTNESS: 0
CONSTIPATION: 0

## 2024-11-29 NOTE — PROGRESS NOTES
.After obtaining consent, and per orders of Yevgeniy Ly,CNP injection of kenalog given in LT glut by Enma Tom MA. Patient instructed to remain in clinic for 20 minutes afterwards, and to report any adverse reaction to me immediately.

## 2024-11-29 NOTE — PROGRESS NOTES
Date of Visit:  2024  Patient Name: Jerman Dickens   Patient :  1980     CHIEF COMPLAINT:     Jerman Dickens is a 44 y.o. male who presents today for an general visit to be evaluated for the following condition(s):  Chief Complaint   Patient presents with    Spasms     Patient states he was seen back in August for similar issue/ symptoms. He states kicked in again Saturday. Pt states does seem like it is getting worse he denies any injury/ falls recently. States has been taking some ibuprofen does have some relief and has been stretching. Denies any urinary symptoms.        HISTORY OF PRESENT ILLNESS     .I reviewed staff HPI/chief complaint and do agree with above    .CHIEF COMPLAINT: Lumbar back pain radiating down the left leg    PRECIPITATING EVENT: Has had similar issues in the past however states most recently with more active at home and moving furniture.  No specific injury pain started    DURATION OF SYMPTOMS: Again this is a recurrent issue for the patient as he has been seen in the past however most recent episode started approximately 6 days ago    Pain Radiation: Pain radiates from the midline lower back into the left lower extremity    Aggravating Factors: Flexion, Extension, Rotation, Change of position (sit to stand), Lifting, Walking, Walking upstairs, Walking downstairs    Alleviating Factors: Has been taking ibuprofen at home which has helped mildly, states sometimes sitting can help also    Pain Ratio: During the pain is not the right paraspinal region as described by patient    AMBULATORY STATUS: Independent Community Distances  ANTIPLATELET OR ANTICOAGULATION STATUS: No  PREVIOUS IMAGING: No previous imaging of spine  PREVIOUS PHYSICAL THERAPY/CHIROPRACTOR: No previous physical therapy or chiropractor services  PREVIOUS CONSERVATIVE TREATMENTS: Over-the-counter ibuprofen and home stretching  PREVIOUS SPINAL SURGERY: No previous spinal surgeries/procedures  RED FLAG

## 2024-12-03 ASSESSMENT — ENCOUNTER SYMPTOMS
SORE THROAT: 0
WHEEZING: 0
VOMITING: 0
EYE ITCHING: 0
NAUSEA: 0
ABDOMINAL PAIN: 0
VOICE CHANGE: 0
COUGH: 0
RHINORRHEA: 0
DIARRHEA: 0
CHEST TIGHTNESS: 1
SHORTNESS OF BREATH: 1

## 2024-12-13 ENCOUNTER — HOSPITAL ENCOUNTER (OUTPATIENT)
Dept: PHYSICAL THERAPY | Age: 44
Setting detail: THERAPIES SERIES
Discharge: HOME | End: 2024-12-13
Payer: COMMERCIAL

## 2024-12-13 PROCEDURE — 97110 THERAPEUTIC EXERCISES: CPT

## 2024-12-13 PROCEDURE — 97162 PT EVAL MOD COMPLEX 30 MIN: CPT

## 2024-12-13 NOTE — PLAN OF CARE
Physical Therapy Evaluation/Plan of Care   [facility] [place] [facility]  [address]  [place] [address]  Dept: [phone]  Dept Fax: [phone]  Loc: [phone]    Physical Therapy: Initial Evaluation    General Information    Patient: Jerman Dickens (44 y.o.     male)   Examination Date: 2024   :  1980 ;    Confirmed: Yes MRN: [id number]  CSN: [id number]   Insurance: Payor: [employer] / Plan: [facility] / Product Type: *No Product type* /   Insurance ID: [id number] - (Commercial)    Secondary Insurance (if applicable):     Referring Physician: Yevgeniy Ly APRN - CNP       Visits to Date/Visits Approved:     No Show/Cancelled Appts: 0 / 0     Medical Diagnosis: Sciatica, left side [M54.32]        Treatment Diagnosis: pain, decreased lumbar AROM, decreased low back and LLE strength, flexibility, balance, +lumbar distraction, decrease lumbar joint mobility, TTP L lumbar paraspinals, piriformis, QL      SUBJECTIVE:     Onset date: 2024       Subjective/ Mechanism of Injury: Pt is a 45 y/o M who presents w/ L sided sciatica pain.  Pt has had similar pain before but it became worse again late August.  Pain has been radiating from middle of lower back and down to LLE.  Pain worse w/ lumbar AROM, lifting, walking, stair negotiation.  Pt reports increased pain from standing and sitting after greater than 15 min.  Pt reports no specific KERLINE for pain.  Pt has received muscle relaxer's and cortisone shots for pain.  Pt uses CPAP machine for sleep apnea.  Pain gets worse throughout the day after work         Precautions/Contraindications/Restrictions: none                Changes in Bowel/Bladder Function: no  Saddle Anesthesia: no  Unexplained Weight Loss: no  Unrelenting Night Pain: no  Sudden Weakness/Falls: no    Interventions for current problem: medications , ice, rest , stretching      Pain:   Current: 5/10   Best: 5/10   
[Arrhythmia/ECG Abnorrmalities] : arrhythmia/ECG abnormalities

## 2024-12-16 ENCOUNTER — OFFICE VISIT (OUTPATIENT)
Age: 44
End: 2024-12-16
Payer: COMMERCIAL

## 2024-12-16 ENCOUNTER — HOSPITAL ENCOUNTER (OUTPATIENT)
Dept: PHYSICAL THERAPY | Age: 44
Setting detail: THERAPIES SERIES
Discharge: HOME | End: 2024-12-16
Payer: COMMERCIAL

## 2024-12-16 VITALS
SYSTOLIC BLOOD PRESSURE: 122 MMHG | HEART RATE: 72 BPM | HEIGHT: 63 IN | BODY MASS INDEX: 40.4 KG/M2 | WEIGHT: 228 LBS | OXYGEN SATURATION: 98 % | TEMPERATURE: 97.6 F | DIASTOLIC BLOOD PRESSURE: 78 MMHG

## 2024-12-16 DIAGNOSIS — E78.2 MIXED HYPERLIPIDEMIA: Primary | ICD-10-CM

## 2024-12-16 DIAGNOSIS — F32.4 MAJOR DEPRESSIVE DISORDER IN PARTIAL REMISSION, UNSPECIFIED WHETHER RECURRENT (HCC): ICD-10-CM

## 2024-12-16 DIAGNOSIS — F41.9 ANXIETY: ICD-10-CM

## 2024-12-16 DIAGNOSIS — M54.32 SCIATICA, LEFT SIDE: ICD-10-CM

## 2024-12-16 PROCEDURE — 99214 OFFICE O/P EST MOD 30 MIN: CPT | Performed by: STUDENT IN AN ORGANIZED HEALTH CARE EDUCATION/TRAINING PROGRAM

## 2024-12-16 PROCEDURE — 97110 THERAPEUTIC EXERCISES: CPT

## 2024-12-16 RX ORDER — ROSUVASTATIN CALCIUM 20 MG/1
20 TABLET, COATED ORAL DAILY
Qty: 90 TABLET | Refills: 1 | Status: SHIPPED | OUTPATIENT
Start: 2024-12-16

## 2024-12-16 RX ORDER — CYCLOBENZAPRINE HCL 5 MG
5 TABLET ORAL 3 TIMES DAILY PRN
COMMUNITY

## 2024-12-16 RX ORDER — HYDROXYZINE PAMOATE 25 MG/1
25 CAPSULE ORAL 3 TIMES DAILY PRN
Qty: 270 CAPSULE | Refills: 1 | Status: SHIPPED | OUTPATIENT
Start: 2024-12-16

## 2024-12-16 RX ORDER — MELOXICAM 15 MG/1
15 TABLET ORAL DAILY
Qty: 30 TABLET | Refills: 2 | Status: SHIPPED | OUTPATIENT
Start: 2024-12-16

## 2024-12-16 ASSESSMENT — PAIN SCALES - GENERAL: PAINLEVEL_OUTOF10: 5

## 2024-12-16 ASSESSMENT — PAIN DESCRIPTION - ORIENTATION: ORIENTATION: LEFT;LOWER

## 2024-12-16 ASSESSMENT — PAIN DESCRIPTION - DESCRIPTORS: DESCRIPTORS: STABBING;SHOOTING

## 2024-12-16 ASSESSMENT — PAIN DESCRIPTION - LOCATION: LOCATION: BACK;LEG

## 2024-12-16 NOTE — PROGRESS NOTES
Skin is warm and dry.      Findings: No rash.   Neurological:      General: No focal deficit present.      Mental Status: He is alert.      Gait: Gait is intact.   Psychiatric:         Mood and Affect: Mood normal.         Behavior: Behavior normal.            Assessment & Plan     Assessment & Plan  1. Low back pain with sciatica   Not currently controlled.The ultrasound results were unremarkable, with no evidence of lipomas. The pain could potentially be associated with his back condition. He has been prescribed Flexeril, which he takes 1 to 2 times a day, and has found it somewhat helpful. He has been set up with physical therapy, which he starts today, and will attend sessions twice a week for 6 to 8 weeks. A prescription for meloxicam 15mg, to be taken once daily, has been provided. He is advised not to take Advil or ibuprofen while on meloxicam. He can continue taking Flexeril as needed. The prescription will be sent to [facility]. Follow up as scheduled.     2. Cholesterol management.  Stable, chronic. He will continue taking rosuvastatin 20 mg for cholesterol management. The prescription will be sent to [facility].    3. Anxiety and depression.  Stable, chronic. He will continue taking sertraline 50 mg for anxiety and depression. Hydroxyzine will be used as needed for anxiety. The prescription will be sent to [facility].    4. Colon cancer screening.  He is advised to undergo colon cancer screening, as the guidelines have changed from starting at age 50 to age 45. This will be planned after he completes his physical therapy.    Follow-up  The patient will follow up in 2 months.      No orders of the defined types were placed in this encounter.    Orders Placed This Encounter   Medications    hydrOXYzine pamoate (VISTARIL) 25 MG capsule     Sig: Take 1 capsule by mouth 3 times daily as needed for Itching     Dispense:  270 capsule     Refill:  1    rosuvastatin (CRESTOR) 20 MG tablet     Sig: Take 1 tablet by

## 2024-12-16 NOTE — PROGRESS NOTES
ADL/Self-care training  Modalities: Heat/Cold, Mechanical Traction, E-stim - unattended, E-stim - manual  Pt to continue current HEP.  See objective section for any therapeutic exercise changes, additions or modifications this date.    Therapy Time:      PT Individual Minutes  Time In: 1041  Time Out: 1120  Minutes: 39  Timed Code Treatment Minutes: 39 Minutes  Procedure Minutes: 0  Timed Activity Minutes Units   Ther Ex 39 3   Electronically signed by Nicho Brunner PTA on 12/16/24 at 12:07 PM EST

## 2024-12-23 ENCOUNTER — HOSPITAL ENCOUNTER (OUTPATIENT)
Dept: PHYSICAL THERAPY | Age: 44
Setting detail: THERAPIES SERIES
Discharge: HOME | End: 2024-12-23
Payer: COMMERCIAL

## 2024-12-23 PROCEDURE — 97110 THERAPEUTIC EXERCISES: CPT

## 2024-12-23 ASSESSMENT — PAIN SCALES - GENERAL: PAINLEVEL_OUTOF10: 3

## 2024-12-23 ASSESSMENT — PAIN DESCRIPTION - DESCRIPTORS: DESCRIPTORS: STABBING;SHOOTING

## 2024-12-23 ASSESSMENT — PAIN DESCRIPTION - ORIENTATION: ORIENTATION: LEFT;LOWER

## 2024-12-23 ASSESSMENT — PAIN DESCRIPTION - LOCATION: LOCATION: BACK;LEG

## 2024-12-23 NOTE — PROGRESS NOTES
[facility]  [address] [place] [address]  Phone: [phone]      Date: 2024  Patient: Jerman Dickens  : 1980   Confirmed: Yes  MRN: [id number]  Referring Provider: Yevgeniy Ly APRN - CNP    Medical Diagnosis: Sciatica, left side [M54.32]       Treatment Diagnosis: pain, decreased lumbar AROM, decreased low back and LLE strength, flexibility, balance, +lumbar distraction, decrease lumbar joint mobility, TTP L lumbar paraspinals, piriformis, QL    Visit Information:  Insurance: Payor: [employer] / Plan: [facility] CHOICE PLUS / Product Type: *No Product type* /   PT Visit Information  Total # of Visits Approved: 60  Total # of Visits to Date: 3  No Show: 0  Canceled Appointment: 0  Progress Note Counter: 3/6-    Subjective Information:  Subjective: Pt feels a lot better today, he feels a little pain in L lower back and foot.  He feels like he can walk normal.  He says he was walking around for around 3 hours before feeling numbness in L leg.  He has been compliant w/ HEP.  HEP Compliance:  [x] Good [] Fair [] Poor [] Reports not doing due to:    Pain Screening  Patient Currently in Pain: Yes  Pain Assessment: 0-10  Pain Level: 3  Pain Location: Back, Leg  Pain Orientation: Left, Lower  Pain Descriptors: Stabbing, Shooting    Treatment:  Exercises:  Exercises  Exercise 1: Pirriformis stretch 30\"x3  Exercise 2: LTR 5\"x10  Exercise 5: SLS 5 x 10-15\" BLE  Exercise 6: Apollo Back Extension w/ Row part of machine 2x10, 2 lbs  Exercise 7: Star trac x 5 minutes L4 S3  Exercise 8: SLR x 15 eri  Exercise 9: Hip Bridges x 15 x 5\"  Exercise 20: HEP: cont current + SLR, hip bridges, Open books       *Indicates exercise, modality, or manual techniques to be initiated when appropriate    Objective Measures:      LTG 6 Current Status:: : L SLS: 30s, R SLS: 30s, no pain reported     Assessment:      Assessment: Pt initiates further exercises to work on improving lumbar extension

## 2024-12-30 ENCOUNTER — HOSPITAL ENCOUNTER (OUTPATIENT)
Dept: PHYSICAL THERAPY | Age: 44
Setting detail: THERAPIES SERIES
Discharge: HOME | End: 2024-12-30
Payer: COMMERCIAL

## 2024-12-30 NOTE — PROGRESS NOTES
Therapy                            Cancellation/No-show Note      Date: 2024  Patient: Jerman Dickens (44 y.o. male)  : 1980  MRN:  [id number]  Referring Physician: Yevgeniy Ly APRN - CNP    Medical Diagnosis: Sciatica, left side [M54.32]      Visit Information:  Visits to Date 3   No Show/Cancelled Appts: 0 /       For today's appointment patient:  [x]  Cancelled  []  Rescheduled appointment  []  No-show   []  Called pt to remind of next appointment     Reason given by patient:  [x]  Patient ill  []  Conflicting appointment  []  No transportation    []  Conflict with work  []  No reason given  []  Other:      [x] Pt has future appointments scheduled, no follow up needed  [] Pt requests to be on hold.    Reason:   If > 2 weeks please discuss with therapist.  [] Therapist to call pt for follow up  [] [place] to call to re-schedule      Comments:       Signature: Electronically signed by Nicho Brunner PTA on 24 at 8:34 AM EST

## 2025-01-03 ENCOUNTER — HOSPITAL ENCOUNTER (OUTPATIENT)
Dept: PHYSICAL THERAPY | Age: 45
Setting detail: THERAPIES SERIES
Discharge: HOME | End: 2025-01-03
Payer: COMMERCIAL

## 2025-01-03 PROCEDURE — 97110 THERAPEUTIC EXERCISES: CPT

## 2025-01-03 RX ORDER — CYCLOBENZAPRINE HCL 5 MG
5 TABLET ORAL 3 TIMES DAILY PRN
Qty: 21 TABLET | Refills: 0 | Status: SHIPPED | OUTPATIENT
Start: 2025-01-03 | End: 2025-01-10

## 2025-01-03 ASSESSMENT — PAIN DESCRIPTION - ORIENTATION: ORIENTATION: LEFT;LOWER

## 2025-01-03 ASSESSMENT — PAIN DESCRIPTION - DESCRIPTORS: DESCRIPTORS: THROBBING

## 2025-01-03 ASSESSMENT — PAIN SCALES - GENERAL: PAINLEVEL_OUTOF10: 5

## 2025-01-03 ASSESSMENT — PAIN DESCRIPTION - LOCATION: LOCATION: BACK;LEG

## 2025-01-03 NOTE — PROGRESS NOTES
[place] [address] [place] [address]  Phone: [phone]      Date: 1/3/2025  Patient: Jerman Dickens  : 1980   Confirmed: Yes  MRN: [id number]  Referring Provider: Yevgeniy Ly APRN - CNP    Medical Diagnosis: Sciatica, left side [M54.32]       Treatment Diagnosis: pain, decreased lumbar AROM, decreased low back and LLE strength, flexibility, balance, +lumbar distraction, decrease lumbar joint mobility, TTP L lumbar paraspinals, piriformis, QL    Visit Information:  Insurance: Payor: [employer] / Plan: [facility] CHOICE PLUS / Product Type: *No Product type* /   PT Visit Information  Total # of Visits Approved: 60  Total # of Visits to Date: 4  No Show: 0  Canceled Appointment: 1  Progress Note Counter: -    Subjective Information:  Subjective: Pt reports he was under the weather this week but feeling better. Unable to do a lot per patient. Pain as been off and on.  HEP Compliance:  [x] Good [] Fair [] Poor [] Reports not doing due to:    Pain Screening  Patient Currently in Pain: Yes  Pain Assessment: 0-10  Pain Level: 5 (5-6/10)  Pain Location: Back, Leg  Pain Orientation: Left, Lower  Pain Descriptors: Throbbing    Treatment:  Exercises:  Exercises  Exercise 1: Pirriformis stretch 20\"x3  Exercise 2: LTR 5\"x10  Exercise 4: Bent Knee Fallouts x 10 ea -10\"  Exercise 6: Apollo Back Extension w/ Row part of machine 2x10, 3 plates  Exercise 7: Star trac x 5 minutes L4 S3  Exercise 8: SLR x 10 eri  Exercise 20: HEP: cont current       Manual:   Manual Therapy  Manual Traction: Trialed manual traction: LLE pulls 5 x 20\" - no change in sxs in L leg       Modalities:          *Indicates exercise, modality, or manual techniques to be initiated when appropriate    Objective Measures:      LTG 6 Current Status:: : L SLS: 30s, R SLS: 30s, no pain reported       Assessment:      Assessment: Continued flexibility and strengthening of the posterior chain to reduce tightness

## 2025-01-06 ENCOUNTER — HOSPITAL ENCOUNTER (OUTPATIENT)
Dept: PHYSICAL THERAPY | Age: 45
Setting detail: THERAPIES SERIES
Discharge: HOME | End: 2025-01-06
Payer: COMMERCIAL

## 2025-01-06 PROCEDURE — 97110 THERAPEUTIC EXERCISES: CPT

## 2025-01-06 ASSESSMENT — PAIN SCALES - GENERAL: PAINLEVEL_OUTOF10: 4

## 2025-01-06 ASSESSMENT — PAIN DESCRIPTION - ORIENTATION: ORIENTATION: LEFT;LOWER

## 2025-01-06 ASSESSMENT — PAIN DESCRIPTION - LOCATION: LOCATION: BACK;LEG

## 2025-01-06 NOTE — PROGRESS NOTES
w/ stretching w/ pt noting improved recovery following standing 4-way hip and apollo back extensions. Pt denies onset of rad sx's throught tx. Pt demo's improved lumbar AROM since eval, though pain remains at end range ext and reports on L sided spasm w/ L rotation.  Treatment Diagnosis: pain, decreased lumbar AROM, decreased low back and LLE strength, flexibility, balance, +lumbar distraction, decrease lumbar joint mobility, TTP L lumbar paraspinals, piriformis, QL  Therapy Prognosis: Good       Patient Education: [] NA   Anatomy of condition    Post-Pain Assessment:       Pain Rating (0-10 pain scale):   5/10   Location and pain description same as pre-treatment unless indicated.   Action: [] NA   [x] Perform HEP  [] Meds as prescribed  [] Modalities as prescribed   [] Call Physician     GOALS   Patient Goal(s): Patient Goals : Decrease pain    Long Term Goals Completed by   Goal Status   LTG 1 The pt will have decreased pain </= 1/10 and be able to stand or sit for greater than 1 hour in order to increase ease with ADL's In progress   LTG 2 The pt will demo improved LLE strength >/= 5/5 and Lumbar extensor strength to normal in order to safely ambulate with decreased pain/limitations In progress   LTG 3 The pt will demo improved lumbar AROM in order to increase ease of ADL's In progress, Partially met   LTG 4 The pt will have an increase in LEFS score >/=30 points in order to increase functional activity tolerance In progress   LTG 5 The pt will have a decrease in POORNIMA  score >/=15 points in order to increase functional activity tolerance In progress   LTG 6 The pt will demo improved B SLS >/=30 seconds to increase ease with ambulation Met   LTG 7 The pt will be independent/compliant with PT HEP in order to demonstrate self management of symptoms upon D/C In progress          Plan:  Frequency/Duration:  Plan  Plan Frequency: 1-2  Plan weeks: 6  Current Treatment Recommendations: Strengthening, ROM, Balance

## 2025-01-10 ENCOUNTER — HOSPITAL ENCOUNTER (OUTPATIENT)
Dept: PHYSICAL THERAPY | Age: 45
Setting detail: THERAPIES SERIES
Discharge: HOME | End: 2025-01-10
Payer: COMMERCIAL

## 2025-01-10 PROCEDURE — 97110 THERAPEUTIC EXERCISES: CPT

## 2025-01-10 ASSESSMENT — PAIN DESCRIPTION - DESCRIPTORS: DESCRIPTORS: TINGLING

## 2025-01-10 ASSESSMENT — PAIN DESCRIPTION - LOCATION: LOCATION: LEG

## 2025-01-10 ASSESSMENT — PAIN DESCRIPTION - ORIENTATION: ORIENTATION: LEFT

## 2025-01-10 ASSESSMENT — PAIN SCALES - GENERAL: PAINLEVEL_OUTOF10: 1

## 2025-01-10 NOTE — PROGRESS NOTES
[place] [address] [place] [address]  Phone: [phone]      Date: 1/10/2025  Patient: Jerman Dickens  : 1980   Confirmed: Yes  MRN: [id number]  Referring Provider: Yevgeniy Ly APRN - CNP    Medical Diagnosis: Sciatica, left side [M54.32]       Treatment Diagnosis: pain, decreased lumbar AROM, decreased low back and LLE strength, flexibility, balance, +lumbar distraction, decrease lumbar joint mobility, TTP L lumbar paraspinals, piriformis, QL    Visit Information:  Insurance: Payor: [employer] / Plan: [facility] CHOICE PLUS / Product Type: *No Product type* /   PT Visit Information  Total # of Visits Approved: 60  Total # of Visits to Date: 6  No Show: 0  Canceled Appointment: 1  Progress Note Counter: -    Subjective Information:  Subjective: Pt reports pain is getting better, \"much better than when we first started.\"  HEP Compliance:  [x] Good [] Fair [] Poor [] Reports not doing due to:    Pain Screening  Patient Currently in Pain: Yes  Pain Assessment: 0-10  Pain Level: 1  Pain Location: Leg  Pain Orientation: Left  Pain Descriptors: Tingling    Treatment:  Exercises:  Exercises  Exercise 1: Pirriformis stretch 20\"x3  Exercise 2: LTR 5\"x10  Exercise 7: Star trac x 6 minutes L5  Exercise 8: SLR 2 x 10 eri  Exercise 10: Back Extensions w/ Body-Solid x10 w/ arms crossed across chest  Exercise 11: standing 4-way hip RTB focus on core/posture x10 ea B  Exercise 20: HEP: cont current       *Indicates exercise, modality, or manual techniques to be initiated when appropriate    Objective Measures:        LTG 1 Current Status:: 1/10: Pt able to stand for 7-8 hours before feeling his pain, sit for greater than 2 hours before feeling pain.          Assessment:      Assessment: Pt continues with 4-way hip and shows improvement in completion with less difficulty but has fatigue at end of completion.  Pt also initiates new back extensor exercise and shows improvement with

## 2025-01-13 ENCOUNTER — HOSPITAL ENCOUNTER (OUTPATIENT)
Dept: PHYSICAL THERAPY | Age: 45
Setting detail: THERAPIES SERIES
Discharge: HOME | End: 2025-01-13
Payer: COMMERCIAL

## 2025-01-13 PROCEDURE — 97110 THERAPEUTIC EXERCISES: CPT

## 2025-01-13 ASSESSMENT — PAIN SCALES - GENERAL: PAINLEVEL_OUTOF10: 5

## 2025-01-13 ASSESSMENT — PAIN DESCRIPTION - LOCATION: LOCATION: LEG

## 2025-01-13 ASSESSMENT — PAIN DESCRIPTION - ORIENTATION: ORIENTATION: LEFT

## 2025-01-13 ASSESSMENT — PAIN DESCRIPTION - DESCRIPTORS: DESCRIPTORS: SORE

## 2025-01-13 NOTE — PROGRESS NOTES
[place] [address] [facility] [place] [address]  Phone: [phone]      Date: 2025  Patient: Jerman Dickens  : 1980   Confirmed: Yes  MRN: [id number]  Referring Provider: Yevgeniy Ly APRN - CNP    Medical Diagnosis: Sciatica, left side [M54.32]       Treatment Diagnosis: pain, decreased lumbar AROM, decreased low back and LLE strength, flexibility, balance, +lumbar distraction, decrease lumbar joint mobility, TTP L lumbar paraspinals, piriformis, QL    Visit Information:  Insurance: Payor: [employer] / Plan: [facility] CHOICE PLUS / Product Type: *No Product type* /   PT Visit Information  Total # of Visits Approved: 60  Total # of Visits to Date: 7  No Show: 0  Canceled Appointment: 1  Progress Note Counter:     Subjective Information:  Subjective: Pt reports feeling \" not too bad.\" Note mild soreness  \"Nothing crazy.\"  HEP Compliance:  [x] Good [] Fair [] Poor [] Reports not doing due to:    Pain Screening  Patient Currently in Pain: Yes  Pain Assessment: 0-10  Pain Level: 5  Pain Location: Leg  Pain Orientation: Left  Pain Descriptors: Sore    Treatment:  Exercises:  Exercises  Exercise 5: Clamshells x 12 ea  (IR/ER on L)  Exercise 6: Apollo Leg Press 2 x 10 - 2 plates  Exercise 7: Star trac x 6 minutes L5  Exercise 9: Open Books x 10 x 5\" eri  Exercise 10: Back Extensions w/ Body-Solid x10 w/ arms crossed across chest  Exercise 11: standing 4-way hip RTB focus on core/posture x10 ea B  Exercise 20: HEP: cont current       *Indicates exercise, modality, or manual techniques to be initiated when appropriate    Objective Measures:     Assessment:      Assessment: Notes mild lower left back pain and subtle N/T down the LLE to the L foot this date at beginning of tx. Completes standing hip, LE, and lower back strengthening with good tolerance, reporting good tolerance and no increases in pain. Reports severity of sxs continues to improve.  Treatment Diagnosis: pain,

## 2025-01-17 ENCOUNTER — HOSPITAL ENCOUNTER (OUTPATIENT)
Dept: PHYSICAL THERAPY | Age: 45
Setting detail: THERAPIES SERIES
Discharge: HOME | End: 2025-01-17
Payer: COMMERCIAL

## 2025-01-17 NOTE — PROGRESS NOTES
Therapy                            Cancellation/No-show Note      Date: 2025  Patient: Jreman Dickens (44 y.o. male)  : 1980  MRN:  [id number]  Referring Physician: Yevgeniy Ly APRN - CNP    Medical Diagnosis: Sciatica, left side [M54.32]      Visit Information:  Visits to Date 7   No Show/Cancelled Appts: 0 / 2      For today's appointment patient:  [x]  Cancelled  []  Rescheduled appointment  []  No-show   []  Called pt to remind of next appointment     Reason given by patient:  []  Patient ill  []  Conflicting appointment  []  No transportation    [x]  Conflict with work  []  No reason given  []  Other:      [x] Pt has future appointments scheduled, no follow up needed  [] Pt requests to be on hold.    Reason:   If > 2 weeks please discuss with therapist.  [] Therapist to call pt for follow up  [] [place] to call to re-schedule      Comments:       Signature: Electronically signed by Nicho Brunner PTA on 25 at 10:05 AM EST

## 2025-01-20 ENCOUNTER — HOSPITAL ENCOUNTER (OUTPATIENT)
Dept: PHYSICAL THERAPY | Age: 45
Setting detail: THERAPIES SERIES
Discharge: HOME | End: 2025-01-20
Payer: COMMERCIAL

## 2025-01-20 PROCEDURE — 97110 THERAPEUTIC EXERCISES: CPT

## 2025-01-20 ASSESSMENT — PAIN DESCRIPTION - ORIENTATION: ORIENTATION: LOWER;LEFT

## 2025-01-20 ASSESSMENT — PAIN SCALES - GENERAL: PAINLEVEL_OUTOF10: 4

## 2025-01-20 ASSESSMENT — PAIN DESCRIPTION - LOCATION: LOCATION: BACK

## 2025-01-20 NOTE — PROGRESS NOTES
[place] [address] [facility] [place] [address]  Phone: [phone]      Date: 2025  Patient: Jerman Dickens  : 1980   Confirmed: Yes  MRN: [id number]  Referring Provider: Yevgeniy Ly APRN - CNP    Medical Diagnosis: Sciatica, left side [M54.32]       Treatment Diagnosis: pain, decreased lumbar AROM, decreased low back and LLE strength, flexibility, balance, +lumbar distraction, decrease lumbar joint mobility, TTP L lumbar paraspinals, piriformis, QL    Visit Information:  Insurance: Payor: [employer] / Plan: [facility] CHOICE PLUS / Product Type: *No Product type* /   PT Visit Information  Total # of Visits Approved: 60  Total # of Visits to Date: 8  No Show: 0  Canceled Appointment: 2  Progress Note Counter:     Subjective Information:  Subjective: Pt reports he has been getting better. Pt states his L lower back is still painful with slight numbness down his leg. Pt reports he has had numbness in his leg for a long time, but it progressively worsened before seeking treatment which led to therapy.  HEP Compliance:  [x] Good [] Fair [] Poor [] Reports not doing due to:    Pain Screening  Patient Currently in Pain: Yes  Pain Assessment: 0-10  Pain Level: 4  Pain Location: Back (numbness in L leg)  Pain Orientation: Lower, Left    Treatment:  Exercises:  Exercises  Exercise 1: Star Trac Cycle: L6 x 5 min, seat 3-4  Exercise 2: Standing lumbar AROM: flex x 5, Rot L/R x 5, SB L/R x 5 ea  Exercise 3: Prone press up x 5  Exercise 4: LTR x 10 eri  Exercise 5: TA sequential marching (up-up-down-down): x 10  Exercise 6: TA bracing with 90/90 foot touchdowns: x 15 eri  Exercise 7: Quadruped hip extensions: x 10 eri  Exercise 8: Bird-Dog x 10 eri       *Indicates exercise, modality, or manual techniques to be initiated when appropriate    Objective Measures:   See below    Assessment:      Assessment: Pt still reports radicular symptoms down his L leg with twisting L and extending  yes

## 2025-01-24 ENCOUNTER — HOSPITAL ENCOUNTER (OUTPATIENT)
Dept: PHYSICAL THERAPY | Age: 45
Setting detail: THERAPIES SERIES
Discharge: HOME | End: 2025-01-24
Payer: COMMERCIAL

## 2025-01-24 PROCEDURE — 97110 THERAPEUTIC EXERCISES: CPT

## 2025-01-24 ASSESSMENT — PAIN DESCRIPTION - LOCATION: LOCATION: LEG

## 2025-01-24 ASSESSMENT — PAIN DESCRIPTION - DESCRIPTORS: DESCRIPTORS: NUMBNESS;TINGLING

## 2025-01-24 ASSESSMENT — PAIN SCALES - GENERAL: PAINLEVEL_OUTOF10: 4

## 2025-01-24 ASSESSMENT — PAIN DESCRIPTION - ORIENTATION: ORIENTATION: LEFT

## 2025-01-24 NOTE — PROGRESS NOTES
[place] [address] [facility] [place] [address]  Phone: [phone]      Date: 2025  Patient: Jerman Dickens  : 1980   Confirmed: Yes  MRN: [id number]  Referring Provider: Yevgeniy Ly APRN - CNP    Medical Diagnosis: Sciatica, left side [M54.32]       Treatment Diagnosis: pain, decreased lumbar AROM, decreased low back and LLE strength, flexibility, balance, +lumbar distraction, decrease lumbar joint mobility, TTP L lumbar paraspinals, piriformis, QL    Visit Information:  Insurance: Payor: [employer] / Plan: [facility] CHOICE PLUS / Product Type: *No Product type* /   PT Visit Information  Total # of Visits Approved: 60  Total # of Visits to Date: 9  No Show: 0  Canceled Appointment: 2  Progress Note Counter:     Subjective Information:  Subjective: Pt reports \"it's not really painful, just the numbness in my foot right now.\" Pt reports he's been doing a lot around the house and noticed \"maybe a little more numbness.\" Pt reports soreness for a few days following LV stating \"it was a good sore.\"  HEP Compliance:  [x] Good [] Fair [] Poor [] Reports not doing due to:    Pain Screening  Patient Currently in Pain: Yes  Pain Assessment: 0-10  Pain Level: 4  Pain Location: Leg  Pain Orientation: Left  Pain Descriptors: Numbness, Tingling    Treatment:  Exercises:  Exercises  Exercise 1: Star Trac Cycle: L6 x 5 min, seat 3-4  Exercise 4: LTR x 10 eri  Exercise 5: TA sequential marching (up-up-down-down): x 10  Exercise 6: TA bracing with 90/90 foot touchdowns: x 15 eri - increased difficulty coordinating movement  Exercise 7: Quadruped hip extensions: x 10 eri  Exercise 8: Bird-Dog x 10 eri  Exercise 9: reverse crunch w/ ball between knees x10  Exercise 10: pallof press RTBx2 2x10  Exercise 11: standing 4-way hip RTB focus on core/posture x10 ea B  Exercise 20: HEP: cont current       *Indicates exercise, modality, or manual techniques to be initiated when

## 2025-01-27 ENCOUNTER — HOSPITAL ENCOUNTER (OUTPATIENT)
Dept: PHYSICAL THERAPY | Age: 45
Setting detail: THERAPIES SERIES
Discharge: HOME | End: 2025-01-27
Payer: COMMERCIAL

## 2025-01-27 PROCEDURE — 97112 NEUROMUSCULAR REEDUCATION: CPT

## 2025-01-27 PROCEDURE — 97110 THERAPEUTIC EXERCISES: CPT

## 2025-01-27 NOTE — PROGRESS NOTES
[facility]  [address] [facility] [place] [address]  Phone: [phone]      Date: 2025  Patient: Jerman Dickens  : 1980   Confirmed: Yes  MRN: [id number]  Referring Provider: Yevgeniy Ly APRN - CNP    Medical Diagnosis: Sciatica, left side [M54.32]       Treatment Diagnosis: pain, decreased lumbar AROM, decreased low back and LLE strength, flexibility, balance, +lumbar distraction, decrease lumbar joint mobility, TTP L lumbar paraspinals, piriformis, QL    Visit Information:  Insurance: Payor: [employer] / Plan: [facility] CHOICE PLUS / Product Type: *No Product type* /   PT Visit Information  Total # of Visits Approved: 60  Total # of Visits to Date: 10  No Show: 0  Canceled Appointment: 2  Progress Note Counter: 10/6-12    Subjective Information:  Subjective: Pt reports hes been doing pretty good, feels a little sore in his L lower back.  Pt reports slight tingle in his foot, has been feeling better,  HEP Compliance:  [x] Good [] Fair [] Poor [] Reports not doing due to:    Pain Screening  Patient Currently in Pain: Denies    Treatment:  Exercises:  Exercises  Exercise 1: Star Trac Cycle: L6 x 4 min, L 7 x 2 min, seat 4  Exercise 3: Body Solid Back Extensions 10x2  Exercise 4: LTR 5\" x 10 eri  Exercise 5: TA sequential marching (up-up-down-down): x 10  Exercise 6: TA bracing with 90/90 foot touchdowns: 2 x 10 eri - increased difficulty coordinating movement  Exercise 8: Bird-Dog x 10 eri  Exercise 9: reverse crunch w/ ball between knees 2 x10  Exercise 10: pallof press GTBx2 2x10  Exercise 11: standing 4-way hip RTB/GTB focus on core/posture x10 ea B (RTB on LLE and GTB on RLE)  Exercise 20: HEP: cont current     *Indicates exercise, modality, or manual techniques to be initiated when appropriate    Objective Measures:      NT    Assessment:      Assessment: Pt continues to show improvement with strengthening exercise and is able to improve his intensity and number of

## 2025-01-31 ENCOUNTER — HOSPITAL ENCOUNTER (OUTPATIENT)
Dept: PHYSICAL THERAPY | Age: 45
Setting detail: THERAPIES SERIES
Discharge: HOME | End: 2025-01-31
Payer: COMMERCIAL

## 2025-01-31 PROCEDURE — 97110 THERAPEUTIC EXERCISES: CPT

## 2025-01-31 NOTE — PROGRESS NOTES
[place] [address] [facility] [place] [address]  Phone: [phone]      Date: 2025  Patient: Jerman Dickens  : 1980   Confirmed: Yes  MRN: [id number]  Referring Provider: Yevgeniy Ly APRN - CNP    Medical Diagnosis: Sciatica, left side [M54.32]       Treatment Diagnosis: pain, decreased lumbar AROM, decreased low back and LLE strength, flexibility, balance, +lumbar distraction, decrease lumbar joint mobility, TTP L lumbar paraspinals, piriformis, QL    Visit Information:  Insurance: Payor: [employer] / Plan: [facility] CHOICE PLUS / Product Type: *No Product type* /   PT Visit Information  Total # of Visits Approved: 60  Total # of Visits to Date: 11  No Show: 0  Canceled Appointment: 2  Progress Note Counter:     Subjective Information:  Subjective: Pt reports he worked the last three days 9 hours each and \"felt pretty good\" following his shifts. Pt reports minimal numbness in L foot that comes and goes, but is better than it was.  HEP Compliance:  [x] Good [] Fair [] Poor [] Reports not doing due to:    Pain Screening  Patient Currently in Pain: Denies    Treatment:  Exercises:  Exercises  Exercise 1: Star Trac Cycle: L7 x6 mins  Exercise 2: objective measures  Exercise 9: reverse crunch w/ ball between knees 2 x10  Exercise 20: HEP:comprehensive HEP administered and reviewed (see paper chart to be scanned at D/C)    *Indicates exercise, modality, or manual techniques to be initiated when appropriate    Objective Measures:     LTG 1 Current Status:: 25: Pt reports pain ranges 0-4/10, on average pain is 0-2/10. Pt reports depends on the day, but can typically make it through a 8-9 hour shift now w/o significant increase in pain  LTG 2 Current Status:: 25 L LE strength: 5/5 extensor 4+/5  LTG 3 Current Status:: 25 lumbar AROM flex 100% ext 100% w/ pain at end range B % B rot 100% - no spasm today  LTG 4 Current Status:: 25 62/80 (45 pt

## 2025-01-31 NOTE — PROGRESS NOTES
[address] [place] [address]  Phone: [phone]    [] Certification  [] Recertification []  Plan of Care  [] Progress Note [x] Discharge      Referring Provider: Yevgeniy Ly APRN - DIANA     From:  Jarrod Haney PT, DPT  Patient: Jerman Dickens (44 y.o. male) : 1980 Date: 2025  Medical Diagnosis: Sciatica, left side [M54.32]       Treatment Diagnosis: pain, decreased lumbar AROM, decreased low back and LLE strength, flexibility, balance, +lumbar distraction, decrease lumbar joint mobility, TTP L lumbar paraspinals, piriformis, QL    Progress Report Period from:  2024  to 2025    Visits to Date: 11 No Show: 0 Cancelled Appts: 2    OBJECTIVE:   Long Term Goals -    Goals Current/ Discharge status Status   Long Term Goal 1: The pt will have decreased pain </= 1/10 and be able to stand or sit for greater than 1 hour in order to increase ease with ADL's LTG 1 Current Status:: 25: Pt reports pain ranges 0-4/10, on average pain is 0-2/10. Pt reports depends on the day, but can typically make it through a 8-9 hour shift now w/o significant increase in pain   Partially met   Long Term Goal 2: The pt will demo improved LLE strength >/= 5/5 and Lumbar extensor strength to normal in order to safely ambulate with decreased pain/limitations LTG 2 Current Status:: 25 L LE strength: 5/5 extensor 4+/5   Partially met   Long Term Goal 3: The pt will demo improved lumbar AROM in order to increase ease of ADL's LTG 3 Current Status:: 25 lumbar AROM flex 100% ext 100% w/ pain at end range B % B rot 100% - no spasm today   Met   Long Term Goal 4: The pt will have an increase in LEFS score >/=30 points in order to increase functional activity tolerance LTG 4 Current Status:: 25 62/80 (45 pt improvement)   Met   Long Term Goal 5: The pt will have a decrease in POORNIMA  score >/=15 points in order to increase functional activity tolerance LTG 5 Current

## 2025-02-24 ENCOUNTER — OFFICE VISIT (OUTPATIENT)
Age: 45
End: 2025-02-24
Payer: COMMERCIAL

## 2025-02-24 VITALS
HEART RATE: 79 BPM | SYSTOLIC BLOOD PRESSURE: 112 MMHG | OXYGEN SATURATION: 97 % | HEIGHT: 63 IN | WEIGHT: 228 LBS | TEMPERATURE: 97.7 F | DIASTOLIC BLOOD PRESSURE: 82 MMHG | BODY MASS INDEX: 40.4 KG/M2

## 2025-02-24 DIAGNOSIS — E78.2 MIXED HYPERLIPIDEMIA: ICD-10-CM

## 2025-02-24 DIAGNOSIS — G47.33 OSA (OBSTRUCTIVE SLEEP APNEA): ICD-10-CM

## 2025-02-24 DIAGNOSIS — M54.32 SCIATICA, LEFT SIDE: Primary | ICD-10-CM

## 2025-02-24 DIAGNOSIS — R73.03 PREDIABETES: ICD-10-CM

## 2025-02-24 DIAGNOSIS — F32.4 MAJOR DEPRESSIVE DISORDER IN PARTIAL REMISSION, UNSPECIFIED WHETHER RECURRENT: ICD-10-CM

## 2025-02-24 DIAGNOSIS — F41.9 ANXIETY: ICD-10-CM

## 2025-02-24 DIAGNOSIS — Z12.11 SCREENING FOR COLON CANCER: ICD-10-CM

## 2025-02-24 PROCEDURE — 99214 OFFICE O/P EST MOD 30 MIN: CPT | Performed by: STUDENT IN AN ORGANIZED HEALTH CARE EDUCATION/TRAINING PROGRAM

## 2025-02-24 RX ORDER — MELOXICAM 15 MG/1
15 TABLET ORAL DAILY
Qty: 90 TABLET | Refills: 1 | Status: CANCELLED | OUTPATIENT
Start: 2025-02-24

## 2025-02-24 SDOH — ECONOMIC STABILITY: FOOD INSECURITY: WITHIN THE PAST 12 MONTHS, YOU WORRIED THAT YOUR FOOD WOULD RUN OUT BEFORE YOU GOT MONEY TO BUY MORE.: NEVER TRUE

## 2025-02-24 SDOH — ECONOMIC STABILITY: FOOD INSECURITY: WITHIN THE PAST 12 MONTHS, THE FOOD YOU BOUGHT JUST DIDN'T LAST AND YOU DIDN'T HAVE MONEY TO GET MORE.: NEVER TRUE

## 2025-02-24 ASSESSMENT — PATIENT HEALTH QUESTIONNAIRE - PHQ9
SUM OF ALL RESPONSES TO PHQ QUESTIONS 1-9: 0
9. THOUGHTS THAT YOU WOULD BE BETTER OFF DEAD, OR OF HURTING YOURSELF: NOT AT ALL
SUM OF ALL RESPONSES TO PHQ QUESTIONS 1-9: 0
SUM OF ALL RESPONSES TO PHQ9 QUESTIONS 1 & 2: 0
1. LITTLE INTEREST OR PLEASURE IN DOING THINGS: NOT AT ALL
SUM OF ALL RESPONSES TO PHQ QUESTIONS 1-9: 0
3. TROUBLE FALLING OR STAYING ASLEEP: NOT AT ALL
4. FEELING TIRED OR HAVING LITTLE ENERGY: NOT AT ALL
8. MOVING OR SPEAKING SO SLOWLY THAT OTHER PEOPLE COULD HAVE NOTICED. OR THE OPPOSITE, BEING SO FIGETY OR RESTLESS THAT YOU HAVE BEEN MOVING AROUND A LOT MORE THAN USUAL: NOT AT ALL
6. FEELING BAD ABOUT YOURSELF - OR THAT YOU ARE A FAILURE OR HAVE LET YOURSELF OR YOUR FAMILY DOWN: NOT AT ALL
10. IF YOU CHECKED OFF ANY PROBLEMS, HOW DIFFICULT HAVE THESE PROBLEMS MADE IT FOR YOU TO DO YOUR WORK, TAKE CARE OF THINGS AT HOME, OR GET ALONG WITH OTHER PEOPLE: NOT DIFFICULT AT ALL
7. TROUBLE CONCENTRATING ON THINGS, SUCH AS READING THE NEWSPAPER OR WATCHING TELEVISION: NOT AT ALL
2. FEELING DOWN, DEPRESSED OR HOPELESS: NOT AT ALL
SUM OF ALL RESPONSES TO PHQ QUESTIONS 1-9: 0
5. POOR APPETITE OR OVEREATING: NOT AT ALL

## 2025-02-24 NOTE — PROGRESS NOTES
Subjective  Jerman Dickens, 44 y.o. male presents today with:  Chief Complaint   Patient presents with    Follow-up    Depression     Feels mood is stable. Denies feeling down & depressed.     Anxiety     Feels stable at this time. Feels hydroxyzine helps.     Pre-Diabetes     A1c was 6.0 on 9/16/24.    Hyperlipidemia     Lipid panel done 7/8/24.       History of Present Illness  The patient is a 44-year-old male who presents for follow-up.    He reports overall good health, with no recent illnesses. He has completed his physical therapy regimen, which he found beneficial in managing his sciatica. He continues to perform the exercises at home using resistance bands provided by the therapist. He has not required meloxicam for the past 2 weeks.    His mood remains stable on his current medication regimen of Zoloft 50 mg. He is requesting a refill of his Zoloft prescription as he only has a week's supply remaining.    He is scheduled for a Cologuard test for colon cancer screening.    He has an upcoming appointment with Dr. Kwan next month to discuss his sleep apnea.    He is still taking Crestor 20 mg without any issues.  He has no other concerns at this time.          Past Medical History:   Diagnosis Date    Bell palsy      History reviewed. No pertinent surgical history.  Current Outpatient Medications   Medication Sig Dispense Refill    sertraline (ZOLOFT) 50 MG tablet Take 1 tablet by mouth daily 90 tablet 1    hydrOXYzine pamoate (VISTARIL) 25 MG capsule Take 1 capsule by mouth 3 times daily as needed for Itching 270 capsule 1    rosuvastatin (CRESTOR) 20 MG tablet Take 1 tablet by mouth daily 90 tablet 1    meloxicam (MOBIC) 15 MG tablet Take 1 tablet by mouth daily 30 tablet 2    albuterol sulfate HFA (VENTOLIN HFA) 108 (90 Base) MCG/ACT inhaler Inhale 2 puffs into the lungs 4 times daily as needed for Wheezing or Shortness of Breath With spacer and instruct on use 18 g 1    CPAP Machine MISC by Does not

## 2025-03-27 ENCOUNTER — OFFICE VISIT (OUTPATIENT)
Dept: PULMONOLOGY | Age: 45
End: 2025-03-27
Payer: COMMERCIAL

## 2025-03-27 VITALS
BODY MASS INDEX: 39.5 KG/M2 | HEART RATE: 73 BPM | DIASTOLIC BLOOD PRESSURE: 72 MMHG | OXYGEN SATURATION: 96 % | SYSTOLIC BLOOD PRESSURE: 120 MMHG | WEIGHT: 223 LBS

## 2025-03-27 DIAGNOSIS — R06.02 SOB (SHORTNESS OF BREATH): ICD-10-CM

## 2025-03-27 DIAGNOSIS — E66.9 OBESITY (BMI 30-39.9): ICD-10-CM

## 2025-03-27 DIAGNOSIS — G47.33 OSA (OBSTRUCTIVE SLEEP APNEA): Primary | ICD-10-CM

## 2025-03-27 PROCEDURE — 99214 OFFICE O/P EST MOD 30 MIN: CPT | Performed by: INTERNAL MEDICINE

## 2025-03-27 NOTE — PROGRESS NOTES
inhaler Inhale 2 puffs into the lungs 4 times daily as needed for Wheezing or Shortness of Breath With spacer and instruct on use 18 g 1    CPAP Machine MISC by Does not apply route New cpap 5-15 CM     PLEASE GIVE ASAP, AHI 28.9 WITH SYMPTOMS 1 each 0    Multiple Vitamins-Minerals (MULTI ADULT GUMMIES PO) Take by mouth      Cholecalciferol (VITAMIN D3) 50 MCG (2000 UT) CAPS Take by mouth      Ascorbic Acid (VITAMIN C) 250 MG tablet Take 1 tablet by mouth daily       No current facility-administered medications for this visit.       Results for orders placed during the hospital encounter of 05/03/22    XR CHEST (2 VW)    Narrative  DIAGNOSIS:J98.4 Scarring of lung ICD10    COMPARISON: December 20, 2011: January 10, 2022    TECHNIQUE: PA and lateral chest    FINDINGS: Inspiration is diminished which results in low lung volumes. An ill-defined opacity is again seen in the left lower lobe. It is similar to previous study. This may reflect scarring or atelectasis. The lungs contain no focal infiltrate, pleural  effusion, consolidation or pneumothorax. Cardiac silhouette is stable. Mild degenerative changes are seen in the dorsal spine.    Impression  Ill-defined opacity is again seen in the anterior left lower lobe. This may represents atelectasis or scarring. It is not significantly change from most previous study. Ill-defined opacity was seen on December 20, 2011 but appears larger on  this study.      Results for orders placed during the hospital encounter of 01/10/22    XR CHEST (2 VW)    Narrative  EXAMINATION: XR CHEST (2 VW)    CLINICAL HISTORY: COVID 19 POSITIVE    COMPARISONS: CT CHEST OCTOBER 12, 2021, CHEST RADIOGRAPH DECEMBER 20, 2011    FINDINGS: Osseous structures intact. Cardiopericardial silhouette normal. Pulmonary vasculature normal. Right diaphragm mildly elevated. Right lung clear. Ill-defined area increased opacity left lower lung anteriorly.    Impression  LEFT LOWER LUNG

## 2025-03-29 LAB — NONINV COLON CA DNA+OCC BLD SCRN STL QL: NEGATIVE

## 2025-03-30 ENCOUNTER — RESULTS FOLLOW-UP (OUTPATIENT)
Age: 45
End: 2025-03-30

## 2025-06-16 DIAGNOSIS — F41.9 ANXIETY: ICD-10-CM

## 2025-06-16 DIAGNOSIS — E78.2 MIXED HYPERLIPIDEMIA: ICD-10-CM

## 2025-06-16 RX ORDER — HYDROXYZINE PAMOATE 25 MG/1
25 CAPSULE ORAL 3 TIMES DAILY PRN
Qty: 270 CAPSULE | Refills: 1 | Status: SHIPPED | OUTPATIENT
Start: 2025-06-16

## 2025-06-16 RX ORDER — ROSUVASTATIN CALCIUM 20 MG/1
20 TABLET, COATED ORAL DAILY
Qty: 90 TABLET | Refills: 1 | Status: SHIPPED | OUTPATIENT
Start: 2025-06-16

## 2025-07-29 ENCOUNTER — OFFICE VISIT (OUTPATIENT)
Age: 45
End: 2025-07-29
Payer: COMMERCIAL

## 2025-07-29 VITALS
HEART RATE: 71 BPM | OXYGEN SATURATION: 98 % | TEMPERATURE: 97.1 F | SYSTOLIC BLOOD PRESSURE: 116 MMHG | WEIGHT: 231 LBS | DIASTOLIC BLOOD PRESSURE: 80 MMHG | BODY MASS INDEX: 40.92 KG/M2

## 2025-07-29 DIAGNOSIS — E66.01 MORBID OBESITY (HCC): ICD-10-CM

## 2025-07-29 DIAGNOSIS — G47.33 OSA (OBSTRUCTIVE SLEEP APNEA): Primary | ICD-10-CM

## 2025-07-29 DIAGNOSIS — R06.02 SOB (SHORTNESS OF BREATH): ICD-10-CM

## 2025-07-29 PROCEDURE — 99214 OFFICE O/P EST MOD 30 MIN: CPT | Performed by: INTERNAL MEDICINE

## 2025-07-29 ASSESSMENT — ENCOUNTER SYMPTOMS
VOICE CHANGE: 0
DIARRHEA: 0
WHEEZING: 0
NAUSEA: 0
COUGH: 0
SHORTNESS OF BREATH: 0
CHEST TIGHTNESS: 0
EYE ITCHING: 0
VOMITING: 0
RHINORRHEA: 0
SORE THROAT: 0
ABDOMINAL PAIN: 0

## 2025-07-29 NOTE — PROGRESS NOTES
ATELECTASIS/PNEUMONIA.  ]  No results found for this or any previous visit.  ]  No results found for this or any previous visit.  ]  Results for orders placed during the hospital encounter of 06/03/22    CT CHEST WO CONTRAST    Narrative  EXAMINATION: CT CHEST WO CONTRAST    DATE:6/3/2022 3:58 PM    CLINICAL HISTORY: Anterior chest pain. Shortness of breath. J98.4 Lung density on x-ray ICD10    COMPARISON:  October 12, 2021    TECHNIQUE: Helical CT was performed through the chest without IV contrast., All CT scans at this facility use dose modulation, iterative reconstruction, and/or weight based dosing when appropriate to reduce radiation dose to as low as reasonably  achievable. Some of this report was completed using Power Scribe 360 voice-recognition technology and may include unintended errors with respect to translation of words, typographical errors or grammatical errors which may not have been identified prior  to the finalization of this report.      FINDINGS:    Lungs: Minimal subpleural groundglass opacities at the right base with clearing of previous bilateral infiltrates seen on the CT study from October 12, 2021. No fresh infiltrate. Some minimal atelectatic changes noted at the left lingula that may  account for chest radiographic findings.    Pleura:Normal. No effusion or thickening    Mediastinum :Mediastinum and [place] are unremarkable.    Vessels:Thoracic aorta is intact.    Bones:No acute osseous abnormalities.    Other:None    Impression  CLEARING OF PREVIOUS BILATERAL INFILTRATES. MINIMAL ATELECTASIS IN THE LEFT LINGULAR AREA.    Assessment/Plan:     1. STEFFEN (obstructive sleep apnea)  He is using CPAP with  5-15  centimeters of H2O with heated humidity for about  7-8   hours every night. He is using CPAP with  nasal  Mask. No need for CPAP supply.He said  sleep is restful with the CPAP use. He is compliant with CPAP therapy and benefiting with CPAP use.No snoring with CPAP use.     I reviewed

## 2025-08-25 ENCOUNTER — OFFICE VISIT (OUTPATIENT)
Age: 45
End: 2025-08-25
Payer: COMMERCIAL

## 2025-08-25 VITALS
OXYGEN SATURATION: 98 % | DIASTOLIC BLOOD PRESSURE: 76 MMHG | TEMPERATURE: 97.7 F | SYSTOLIC BLOOD PRESSURE: 118 MMHG | HEIGHT: 63 IN | HEART RATE: 65 BPM | BODY MASS INDEX: 42.17 KG/M2 | WEIGHT: 238 LBS

## 2025-08-25 DIAGNOSIS — E78.2 MIXED HYPERLIPIDEMIA: ICD-10-CM

## 2025-08-25 DIAGNOSIS — G47.33 OSA (OBSTRUCTIVE SLEEP APNEA): ICD-10-CM

## 2025-08-25 DIAGNOSIS — F41.9 ANXIETY: Primary | ICD-10-CM

## 2025-08-25 DIAGNOSIS — R73.03 PREDIABETES: ICD-10-CM

## 2025-08-25 DIAGNOSIS — F32.4 MAJOR DEPRESSIVE DISORDER IN PARTIAL REMISSION, UNSPECIFIED WHETHER RECURRENT: ICD-10-CM

## 2025-08-25 PROCEDURE — 99214 OFFICE O/P EST MOD 30 MIN: CPT | Performed by: STUDENT IN AN ORGANIZED HEALTH CARE EDUCATION/TRAINING PROGRAM
